# Patient Record
Sex: MALE | Race: BLACK OR AFRICAN AMERICAN | Employment: UNEMPLOYED | URBAN - METROPOLITAN AREA
[De-identification: names, ages, dates, MRNs, and addresses within clinical notes are randomized per-mention and may not be internally consistent; named-entity substitution may affect disease eponyms.]

---

## 2021-08-03 ENCOUNTER — APPOINTMENT (OUTPATIENT)
Dept: GENERAL RADIOLOGY | Age: 31
DRG: 871 | End: 2021-08-03
Attending: EMERGENCY MEDICINE

## 2021-08-03 ENCOUNTER — HOSPITAL ENCOUNTER (INPATIENT)
Age: 31
LOS: 8 days | Discharge: HOME | DRG: 871 | End: 2021-08-11
Attending: EMERGENCY MEDICINE | Admitting: HOSPITALIST

## 2021-08-03 DIAGNOSIS — J12.82 PNEUMONIA DUE TO COVID-19 VIRUS: Primary | ICD-10-CM

## 2021-08-03 DIAGNOSIS — U07.1 PNEUMONIA DUE TO COVID-19 VIRUS: Primary | ICD-10-CM

## 2021-08-03 DIAGNOSIS — J96.01 ACUTE RESPIRATORY FAILURE WITH HYPOXEMIA (HCC): ICD-10-CM

## 2021-08-03 LAB
25(OH)D3 SERPL-MCNC: 10.3 NG/ML (ref 30–100)
ALBUMIN SERPL-MCNC: 3.4 G/DL (ref 3.5–5)
ALBUMIN/GLOB SERPL: 0.8 {RATIO} (ref 1.1–2.2)
ALP SERPL-CCNC: 84 U/L (ref 45–117)
ALT SERPL-CCNC: 47 U/L (ref 12–78)
ANION GAP SERPL CALC-SCNC: 7 MMOL/L (ref 5–15)
APTT PPP: 25.9 SEC (ref 22.1–31)
AST SERPL-CCNC: 51 U/L (ref 15–37)
BASOPHILS # BLD: 0 K/UL (ref 0–0.1)
BASOPHILS NFR BLD: 0 % (ref 0–1)
BILIRUB SERPL-MCNC: 0.5 MG/DL (ref 0.2–1)
BUN SERPL-MCNC: 8 MG/DL (ref 6–20)
BUN/CREAT SERPL: 7 (ref 12–20)
CALCIUM SERPL-MCNC: 8.6 MG/DL (ref 8.5–10.1)
CHLORIDE SERPL-SCNC: 102 MMOL/L (ref 97–108)
CO2 SERPL-SCNC: 28 MMOL/L (ref 21–32)
COMMENT, HOLDF: NORMAL
COVID-19 RAPID TEST, COVR: NOT DETECTED
CREAT SERPL-MCNC: 1.08 MG/DL (ref 0.7–1.3)
CRP SERPL-MCNC: 5.06 MG/DL (ref 0–0.6)
D DIMER PPP FEU-MCNC: 0.44 MG/L FEU (ref 0–0.65)
DIFFERENTIAL METHOD BLD: ABNORMAL
EOSINOPHIL # BLD: 0 K/UL (ref 0–0.4)
EOSINOPHIL NFR BLD: 0 % (ref 0–7)
ERYTHROCYTE [DISTWIDTH] IN BLOOD BY AUTOMATED COUNT: 12.5 % (ref 11.5–14.5)
FERRITIN SERPL-MCNC: 311 NG/ML (ref 26–388)
GLOBULIN SER CALC-MCNC: 4.5 G/DL (ref 2–4)
GLUCOSE BLD STRIP.AUTO-MCNC: 119 MG/DL (ref 65–117)
GLUCOSE BLD STRIP.AUTO-MCNC: 120 MG/DL (ref 65–117)
GLUCOSE BLD STRIP.AUTO-MCNC: 122 MG/DL (ref 65–117)
GLUCOSE SERPL-MCNC: 108 MG/DL (ref 65–100)
HCT VFR BLD AUTO: 43.4 % (ref 36.6–50.3)
HGB BLD-MCNC: 14.8 G/DL (ref 12.1–17)
IMM GRANULOCYTES # BLD AUTO: 0 K/UL
IMM GRANULOCYTES NFR BLD AUTO: 0 %
INR PPP: 1 (ref 0.9–1.1)
LACTATE SERPL-SCNC: 1.3 MMOL/L (ref 0.4–2)
LDH SERPL L TO P-CCNC: 449 U/L (ref 85–241)
LYMPHOCYTES # BLD: 0.4 K/UL (ref 0.8–3.5)
LYMPHOCYTES NFR BLD: 10 % (ref 12–49)
MAGNESIUM SERPL-MCNC: 2.3 MG/DL (ref 1.6–2.4)
MCH RBC QN AUTO: 29.4 PG (ref 26–34)
MCHC RBC AUTO-ENTMCNC: 34.1 G/DL (ref 30–36.5)
MCV RBC AUTO: 86.1 FL (ref 80–99)
MONOCYTES # BLD: 0.1 K/UL (ref 0–1)
MONOCYTES NFR BLD: 2 % (ref 5–13)
NEUTS BAND NFR BLD MANUAL: 5 % (ref 0–6)
NEUTS SEG # BLD: 3.2 K/UL (ref 1.8–8)
NEUTS SEG NFR BLD: 83 % (ref 32–75)
NRBC # BLD: 0 K/UL (ref 0–0.01)
NRBC BLD-RTO: 0 PER 100 WBC
PLATELET # BLD AUTO: 140 K/UL (ref 150–400)
POTASSIUM SERPL-SCNC: 3.8 MMOL/L (ref 3.5–5.1)
PROCALCITONIN SERPL-MCNC: <0.05 NG/ML
PROT SERPL-MCNC: 7.9 G/DL (ref 6.4–8.2)
PROTHROMBIN TIME: 10.4 SEC (ref 9–11.1)
RBC # BLD AUTO: 5.04 M/UL (ref 4.1–5.7)
RBC MORPH BLD: ABNORMAL
SAMPLES BEING HELD,HOLD: NORMAL
SARS-COV-2, COV2: NORMAL
SERVICE CMNT-IMP: ABNORMAL
SODIUM SERPL-SCNC: 137 MMOL/L (ref 136–145)
SOURCE, COVRS: NORMAL
THERAPEUTIC RANGE,PTTT: NORMAL SECS (ref 58–77)
TROPONIN I SERPL-MCNC: <0.05 NG/ML
WBC # BLD AUTO: 3.7 K/UL (ref 4.1–11.1)

## 2021-08-03 PROCEDURE — 82962 GLUCOSE BLOOD TEST: CPT

## 2021-08-03 PROCEDURE — 65660000001 HC RM ICU INTERMED STEPDOWN

## 2021-08-03 PROCEDURE — 83735 ASSAY OF MAGNESIUM: CPT

## 2021-08-03 PROCEDURE — 96374 THER/PROPH/DIAG INJ IV PUSH: CPT

## 2021-08-03 PROCEDURE — 85730 THROMBOPLASTIN TIME PARTIAL: CPT

## 2021-08-03 PROCEDURE — 83615 LACTATE (LD) (LDH) ENZYME: CPT

## 2021-08-03 PROCEDURE — 74011250636 HC RX REV CODE- 250/636: Performed by: EMERGENCY MEDICINE

## 2021-08-03 PROCEDURE — 85610 PROTHROMBIN TIME: CPT

## 2021-08-03 PROCEDURE — 80053 COMPREHEN METABOLIC PANEL: CPT

## 2021-08-03 PROCEDURE — 93005 ELECTROCARDIOGRAM TRACING: CPT

## 2021-08-03 PROCEDURE — 74011000250 HC RX REV CODE- 250: Performed by: HOSPITALIST

## 2021-08-03 PROCEDURE — 74011250637 HC RX REV CODE- 250/637: Performed by: EMERGENCY MEDICINE

## 2021-08-03 PROCEDURE — 71045 X-RAY EXAM CHEST 1 VIEW: CPT

## 2021-08-03 PROCEDURE — XW033E5 INTRODUCTION OF REMDESIVIR ANTI-INFECTIVE INTO PERIPHERAL VEIN, PERCUTANEOUS APPROACH, NEW TECHNOLOGY GROUP 5: ICD-10-PCS | Performed by: HOSPITALIST

## 2021-08-03 PROCEDURE — 87635 SARS-COV-2 COVID-19 AMP PRB: CPT

## 2021-08-03 PROCEDURE — 74011000258 HC RX REV CODE- 258: Performed by: HOSPITALIST

## 2021-08-03 PROCEDURE — 74011250637 HC RX REV CODE- 250/637: Performed by: HOSPITALIST

## 2021-08-03 PROCEDURE — 82306 VITAMIN D 25 HYDROXY: CPT

## 2021-08-03 PROCEDURE — 84145 PROCALCITONIN (PCT): CPT

## 2021-08-03 PROCEDURE — 82728 ASSAY OF FERRITIN: CPT

## 2021-08-03 PROCEDURE — 85379 FIBRIN DEGRADATION QUANT: CPT

## 2021-08-03 PROCEDURE — 74011250636 HC RX REV CODE- 250/636: Performed by: HOSPITALIST

## 2021-08-03 PROCEDURE — 85025 COMPLETE CBC W/AUTO DIFF WBC: CPT

## 2021-08-03 PROCEDURE — 84484 ASSAY OF TROPONIN QUANT: CPT

## 2021-08-03 PROCEDURE — 99285 EMERGENCY DEPT VISIT HI MDM: CPT

## 2021-08-03 PROCEDURE — 36415 COLL VENOUS BLD VENIPUNCTURE: CPT

## 2021-08-03 PROCEDURE — U0003 INFECTIOUS AGENT DETECTION BY NUCLEIC ACID (DNA OR RNA); SEVERE ACUTE RESPIRATORY SYNDROME CORONAVIRUS 2 (SARS-COV-2) (CORONAVIRUS DISEASE [COVID-19]), AMPLIFIED PROBE TECHNIQUE, MAKING USE OF HIGH THROUGHPUT TECHNOLOGIES AS DESCRIBED BY CMS-2020-01-R: HCPCS

## 2021-08-03 PROCEDURE — 83605 ASSAY OF LACTIC ACID: CPT

## 2021-08-03 PROCEDURE — 86140 C-REACTIVE PROTEIN: CPT

## 2021-08-03 RX ORDER — MAGNESIUM SULFATE 100 %
4 CRYSTALS MISCELLANEOUS AS NEEDED
Status: DISCONTINUED | OUTPATIENT
Start: 2021-08-03 | End: 2021-08-11 | Stop reason: HOSPADM

## 2021-08-03 RX ORDER — DEXAMETHASONE SODIUM PHOSPHATE 10 MG/ML
6 INJECTION INTRAMUSCULAR; INTRAVENOUS EVERY 24 HOURS
Status: DISCONTINUED | OUTPATIENT
Start: 2021-08-03 | End: 2021-08-09 | Stop reason: ALTCHOICE

## 2021-08-03 RX ORDER — ZINC SULFATE 50(220)MG
1 CAPSULE ORAL DAILY
Status: DISCONTINUED | OUTPATIENT
Start: 2021-08-03 | End: 2021-08-11 | Stop reason: HOSPADM

## 2021-08-03 RX ORDER — INSULIN LISPRO 100 [IU]/ML
INJECTION, SOLUTION INTRAVENOUS; SUBCUTANEOUS
Status: DISCONTINUED | OUTPATIENT
Start: 2021-08-03 | End: 2021-08-11 | Stop reason: HOSPADM

## 2021-08-03 RX ORDER — DEXTROSE 50 % IN WATER (D50W) INTRAVENOUS SYRINGE
12.5-25 AS NEEDED
Status: DISCONTINUED | OUTPATIENT
Start: 2021-08-03 | End: 2021-08-11 | Stop reason: HOSPADM

## 2021-08-03 RX ORDER — DEXAMETHASONE SODIUM PHOSPHATE 10 MG/ML
10 INJECTION INTRAMUSCULAR; INTRAVENOUS
Status: COMPLETED | OUTPATIENT
Start: 2021-08-03 | End: 2021-08-03

## 2021-08-03 RX ORDER — ACETAMINOPHEN 325 MG/1
650 TABLET ORAL
Status: DISCONTINUED | OUTPATIENT
Start: 2021-08-03 | End: 2021-08-11 | Stop reason: HOSPADM

## 2021-08-03 RX ORDER — ACETAMINOPHEN 500 MG
1000 TABLET ORAL
Status: COMPLETED | OUTPATIENT
Start: 2021-08-03 | End: 2021-08-03

## 2021-08-03 RX ORDER — ENOXAPARIN SODIUM 100 MG/ML
30 INJECTION SUBCUTANEOUS EVERY 12 HOURS
Status: DISCONTINUED | OUTPATIENT
Start: 2021-08-03 | End: 2021-08-11 | Stop reason: HOSPADM

## 2021-08-03 RX ORDER — ASCORBIC ACID 500 MG
500 TABLET ORAL 2 TIMES DAILY
Status: DISCONTINUED | OUTPATIENT
Start: 2021-08-03 | End: 2021-08-11 | Stop reason: HOSPADM

## 2021-08-03 RX ADMIN — ENOXAPARIN SODIUM 30 MG: 40 INJECTION SUBCUTANEOUS at 21:32

## 2021-08-03 RX ADMIN — ENOXAPARIN SODIUM 30 MG: 40 INJECTION SUBCUTANEOUS at 09:37

## 2021-08-03 RX ADMIN — REMDESIVIR 200 MG: 100 INJECTION, POWDER, LYOPHILIZED, FOR SOLUTION INTRAVENOUS at 18:53

## 2021-08-03 RX ADMIN — DEXAMETHASONE SODIUM PHOSPHATE 6 MG: 10 INJECTION, SOLUTION INTRAMUSCULAR; INTRAVENOUS at 09:37

## 2021-08-03 RX ADMIN — ACETAMINOPHEN 650 MG: 325 TABLET ORAL at 23:02

## 2021-08-03 RX ADMIN — ACETAMINOPHEN 1000 MG: 500 TABLET ORAL at 06:24

## 2021-08-03 RX ADMIN — DEXAMETHASONE SODIUM PHOSPHATE 10 MG: 10 INJECTION, SOLUTION INTRAMUSCULAR; INTRAVENOUS at 06:25

## 2021-08-03 RX ADMIN — ZINC SULFATE 220 MG (50 MG) CAPSULE 1 CAPSULE: CAPSULE at 13:38

## 2021-08-03 RX ADMIN — OXYCODONE HYDROCHLORIDE AND ACETAMINOPHEN 500 MG: 500 TABLET ORAL at 18:58
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
## 2021-08-03 NOTE — ED TRIAGE NOTES
Pt arrives via EMS from home w/ CC COVID-19 infx. Reports he was dx w/ COVID-19 3 days ago and. SX includes HA, cough and back pain. EMS reports VSS.

## 2021-08-03 NOTE — PROGRESS NOTES
8/3/2021 -   TRANSITIONS OF CARE PLAN:   1. RUR: 7%  DESTINATION: TBD - likely own home  2. TRANSPORT: Transport Assistance  3. NEEDS FOR DISCHARGE:  4. ANTICIPATED FOLLOW UPS:  5. ONGOING INPATIENT NEEDS: O2 Supplementation with weaning as needed and possible, COVID PUI, Decadron, Possibly Remdesivir, monitor labs,     Anticipated Discharge is: Greater than 48 Hours    Reason for Admission:  Acute Hypoxemic Respiratory Failure, COVID 19                   RUR Score:       7%              Plan for utilizing home health:      TBD    PCP: First and Last name:  \"Светлана\"      [facility]   Are you a current patient: Yes/No: yes    Approximate date of last visit: 1 year; next on 8/9   Can you participate in a virtual visit with your PCP:  Yes                    Current Advanced Directive/Advance Care Plan: Full Code    Healthcare Decision Maker:   Click here to complete [id number] including selection of the Healthcare Decision Maker Relationship (ie \"Primary\")           Primary Decision Maker: Seth Kristalshaji - Sister: [phone]                  Transition of Care Plan:    CM notes that patient is currently on Droplet Plus Precautions. CM spoke with patient by room phone. Patient lives with mom in a 2 story home, 6 exterior steps, first floor living, 16 interior steps. Patient is independent in ADLs, to include driving. Patient is currently an  for [employer]. Patient has no hx of HH or Rehab. Pharmacy preference is unknown. Correct phone number is: [phone]             ACP completed, and patient agreed to AMD referral.  Referral submitted via CC. Per patient, he has a [facility] ins plan. CM notified UR of the above. Likely disposition is for discharge to own home with transport assistance. Care Management Interventions  PCP Verified by CM:  Yes ([facility])  Palliative Care Criteria Met (RRAT>21 & CHF Dx)?: No  Mode of Transport at Discharge:  Other (see comment) (will need transport assist)  Transition of Care Consult (CM Consult): Discharge Planning  MyChart Signup: No  Discharge Durable Medical Equipment: No (none)  Health Maintenance Reviewed: Yes (cm spoke with patient by phone with patient alert and oriented x4)  Physical Therapy Consult: No  Occupational Therapy Consult: No  Speech Therapy Consult: No  Current Support Network: Own Home, Family Lives Nearby  Confirm Follow Up Transport: Self (independent in adls, to include driving)  [employer] Provided?: No  Discharge Location  Discharge Placement: Home with family assistance (lives with mother in a 2 story home, first floor living, 6 exterior steps)  CRM: Agueda Lynch, MPH, [address] [place] [address]; Z: [phone]

## 2021-08-03 NOTE — PROGRESS NOTES
1249H  Patient's discharge paper work at bedside and shows patient is positive for Covid-19. Patient states it has been 4 days since he was diagnosed. Dr. Benito Sim paged to notify, unable to perfect serve.
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
## 2021-08-03 NOTE — PROGRESS NOTES
1906H   Attempted to call report, admitting RN currently in patient's room, will call back. 1915H    SBAR report given to LAURA Solitario of [facility].

## 2021-08-03 NOTE — PROGRESS NOTES
1900H   Staff reported that patient was banging on the door and was slowly lowering himself down. Staff assisted patient to be back on the stretcher. When this RN evaluated the patient, he reported that he was having a chest pain while coughing and pressed the call bell. When nobody attended to his call, he scooted to the end of the stretcher and reported that he he fell and hit his L fore head. Two side rails were up. While being assessed, he reported that his chest pain was going down about 6 out of 10. Attending MD to be notified. 1916H     The patient is resting quietly. Patient was advised to wait for assistance and do get out of bed. Patient acknowledged.

## 2021-08-04 LAB
ALBUMIN SERPL-MCNC: 2.9 G/DL (ref 3.5–5)
ALBUMIN/GLOB SERPL: 0.7 {RATIO} (ref 1.1–2.2)
ALP SERPL-CCNC: 75 U/L (ref 45–117)
ALT SERPL-CCNC: 53 U/L (ref 12–78)
ANION GAP SERPL CALC-SCNC: 6 MMOL/L (ref 5–15)
AST SERPL-CCNC: 49 U/L (ref 15–37)
ATRIAL RATE: 105 BPM
BASOPHILS # BLD: 0 K/UL (ref 0–0.1)
BASOPHILS NFR BLD: 0 % (ref 0–1)
BILIRUB SERPL-MCNC: 0.4 MG/DL (ref 0.2–1)
BUN SERPL-MCNC: 15 MG/DL (ref 6–20)
BUN/CREAT SERPL: 21 (ref 12–20)
CALCIUM SERPL-MCNC: 8.3 MG/DL (ref 8.5–10.1)
CALCULATED P AXIS, ECG09: 35 DEGREES
CALCULATED R AXIS, ECG10: 21 DEGREES
CALCULATED T AXIS, ECG11: 0 DEGREES
CHLORIDE SERPL-SCNC: 106 MMOL/L (ref 97–108)
CO2 SERPL-SCNC: 25 MMOL/L (ref 21–32)
CREAT SERPL-MCNC: 0.71 MG/DL (ref 0.7–1.3)
CRP SERPL-MCNC: 4.8 MG/DL (ref 0–0.6)
DIAGNOSIS, 93000: NORMAL
DIFFERENTIAL METHOD BLD: ABNORMAL
EOSINOPHIL # BLD: 0 K/UL (ref 0–0.4)
EOSINOPHIL NFR BLD: 0 % (ref 0–7)
ERYTHROCYTE [DISTWIDTH] IN BLOOD BY AUTOMATED COUNT: 12.4 % (ref 11.5–14.5)
GLOBULIN SER CALC-MCNC: 4.4 G/DL (ref 2–4)
GLUCOSE BLD STRIP.AUTO-MCNC: 103 MG/DL (ref 65–117)
GLUCOSE BLD STRIP.AUTO-MCNC: 105 MG/DL (ref 65–117)
GLUCOSE BLD STRIP.AUTO-MCNC: 111 MG/DL (ref 65–117)
GLUCOSE BLD STRIP.AUTO-MCNC: 137 MG/DL (ref 65–117)
GLUCOSE SERPL-MCNC: 109 MG/DL (ref 65–100)
HCT VFR BLD AUTO: 40.8 % (ref 36.6–50.3)
HGB BLD-MCNC: 13.4 G/DL (ref 12.1–17)
IMM GRANULOCYTES # BLD AUTO: 0 K/UL (ref 0–0.04)
IMM GRANULOCYTES NFR BLD AUTO: 0 % (ref 0–0.5)
LYMPHOCYTES # BLD: 0.6 K/UL (ref 0.8–3.5)
LYMPHOCYTES NFR BLD: 11 % (ref 12–49)
MCH RBC QN AUTO: 28.5 PG (ref 26–34)
MCHC RBC AUTO-ENTMCNC: 32.8 G/DL (ref 30–36.5)
MCV RBC AUTO: 86.6 FL (ref 80–99)
MONOCYTES # BLD: 0.3 K/UL (ref 0–1)
MONOCYTES NFR BLD: 6 % (ref 5–13)
NEUTS SEG # BLD: 4.7 K/UL (ref 1.8–8)
NEUTS SEG NFR BLD: 83 % (ref 32–75)
NRBC # BLD: 0 K/UL (ref 0–0.01)
NRBC BLD-RTO: 0 PER 100 WBC
P-R INTERVAL, ECG05: 134 MS
PLATELET # BLD AUTO: 195 K/UL (ref 150–400)
PMV BLD AUTO: 10.7 FL (ref 8.9–12.9)
POTASSIUM SERPL-SCNC: 4 MMOL/L (ref 3.5–5.1)
PROCALCITONIN SERPL-MCNC: <0.05 NG/ML
PROT SERPL-MCNC: 7.3 G/DL (ref 6.4–8.2)
Q-T INTERVAL, ECG07: 306 MS
QRS DURATION, ECG06: 76 MS
QTC CALCULATION (BEZET), ECG08: 404 MS
RBC # BLD AUTO: 4.71 M/UL (ref 4.1–5.7)
RBC MORPH BLD: ABNORMAL
SARS-COV-2, XPLCVT: DETECTED
SERVICE CMNT-IMP: ABNORMAL
SERVICE CMNT-IMP: NORMAL
SODIUM SERPL-SCNC: 137 MMOL/L (ref 136–145)
SOURCE, COVRS: ABNORMAL
VENTRICULAR RATE, ECG03: 105 BPM
WBC # BLD AUTO: 5.6 K/UL (ref 4.1–11.1)

## 2021-08-04 PROCEDURE — 80053 COMPREHEN METABOLIC PANEL: CPT

## 2021-08-04 PROCEDURE — 36415 COLL VENOUS BLD VENIPUNCTURE: CPT

## 2021-08-04 PROCEDURE — 74011250637 HC RX REV CODE- 250/637: Performed by: HOSPITALIST

## 2021-08-04 PROCEDURE — 74011250636 HC RX REV CODE- 250/636: Performed by: HOSPITALIST

## 2021-08-04 PROCEDURE — 85025 COMPLETE CBC W/AUTO DIFF WBC: CPT

## 2021-08-04 PROCEDURE — 82962 GLUCOSE BLOOD TEST: CPT

## 2021-08-04 PROCEDURE — 65660000001 HC RM ICU INTERMED STEPDOWN

## 2021-08-04 PROCEDURE — 74011000250 HC RX REV CODE- 250: Performed by: HOSPITALIST

## 2021-08-04 PROCEDURE — 74011000258 HC RX REV CODE- 258: Performed by: HOSPITALIST

## 2021-08-04 PROCEDURE — 86140 C-REACTIVE PROTEIN: CPT

## 2021-08-04 PROCEDURE — 84145 PROCALCITONIN (PCT): CPT

## 2021-08-04 RX ORDER — MELATONIN
2000 DAILY
Status: DISCONTINUED | OUTPATIENT
Start: 2021-08-04 | End: 2021-08-11 | Stop reason: HOSPADM

## 2021-08-04 RX ADMIN — OXYCODONE HYDROCHLORIDE AND ACETAMINOPHEN 500 MG: 500 TABLET ORAL at 18:55

## 2021-08-04 RX ADMIN — ENOXAPARIN SODIUM 30 MG: 40 INJECTION SUBCUTANEOUS at 21:38

## 2021-08-04 RX ADMIN — OXYCODONE HYDROCHLORIDE AND ACETAMINOPHEN 500 MG: 500 TABLET ORAL at 10:21

## 2021-08-04 RX ADMIN — ENOXAPARIN SODIUM 30 MG: 40 INJECTION SUBCUTANEOUS at 10:21

## 2021-08-04 RX ADMIN — Medication 2000 UNITS: at 10:38

## 2021-08-04 RX ADMIN — ACETAMINOPHEN 650 MG: 325 TABLET ORAL at 23:18

## 2021-08-04 RX ADMIN — DEXAMETHASONE SODIUM PHOSPHATE 6 MG: 10 INJECTION, SOLUTION INTRAMUSCULAR; INTRAVENOUS at 10:21

## 2021-08-04 RX ADMIN — ACETAMINOPHEN 650 MG: 325 TABLET ORAL at 10:38

## 2021-08-04 RX ADMIN — REMDESIVIR 100 MG: 100 INJECTION, POWDER, LYOPHILIZED, FOR SOLUTION INTRAVENOUS at 18:55

## 2021-08-04 RX ADMIN — ZINC SULFATE 220 MG (50 MG) CAPSULE 1 CAPSULE: CAPSULE at 10:21

## 2021-08-04 NOTE — PROGRESS NOTES
Bedside and Verbal shift change report given to LAURA Park (oncoming nurse) by Raymundo Randhawa RN (offgoing nurse). Report included the following information SBAR, Kardex, Intake/Output, MAR and Recent Results.

## 2021-08-04 NOTE — PROGRESS NOTES
Hospitalist Progress Note                               Stephanie Nunn MD                                     Answering service: [phone] from in house phone                                         Date of Service:  2021  NAME:  Cole Wallace  :  1990  MRN:  [id number]      Admission Summary:   Cole Wallace is a 28 y.o. male  With no significant PMH came to the emergency room with cc of shortness of breath.       Patient states that he goes to school in [place], one of his co worker was sick and since Friday patient started having fever,bodyaches,headache. He went to ER [facility]- they told him he was positive for COVID 19,he was discharged with instructions to quarantine . he came to [facility] on . His symptoms progressively worsened associated  with cough and worsening shortness of breath in the last 2 days. He feels nauseous and some diarrhea as well. Reason for follow up:     COVID pneumonia     Assessment & Plan:     1. Acute hypoxic respiratory failure due to COVID Pneumonia: On 6 L of NC. Saturating well 94 percent. On Dexamethasone and Remdesivir. D Dimer 0.44. CRP 5 -> 4.8. Rapid COVID was negative in ER but was positive in [place] . PCR is pending. In no acute distress breathing comfortably. 2. History of NIDDM: On no meds at home. Monitor sugars as on Steroids now. 3. Vit D Defficency : Vit D added      Diet: Diabetic  Code status: Full  DVT prophylaxis: Lovenox  Care Plan discussed with: Patient and RN  Patient has given Verbal permission to discuss medical care with   persons present in the room and and also with contact as listed on face sheet.    Discharge planning/disposition: when stable    Hospital Problems  Date Reviewed: 2011        Codes Class Noted POA    Acute hypoxemic respiratory failure [facility]) ICD-10-CM: J96.01  ICD-9-CM: 518.81  8/3/2021 Unknown        COVID-19 ICD-10-CM: U07.1  ICD-9-CM: 079.89  8/3/2021 Unknown                Review of Systems:   A comprehensive review of systems was negative except for that written in the HPI. Physical Examination:      Last 24hrs VS reviewed since prior progress note. Most recent are:  Visit Vitals  /65 (BP 1 Location: Right upper arm, BP Patient Position: At rest)   Pulse 78   Temp 98.4 °F (36.9 °C)   Resp 16   Ht 5' 4\" (1.626 m)   Wt 71.8 kg (158 lb 6.4 oz)   SpO2 96%   BMI 27.19 kg/m²           Constitutional:  No acute distress, cooperative, pleasant    HEENT: Head is a traumatic,  Un icteric sclera. Pink conjunctiva,no erythema or discharge. Oral mucous moist, oropharynx benign. Neck supple,    Resp:  CTA bilaterally. No wheezing/rhonchi/rales. No accessory muscle use   CV:  Regular rhythm, normal rate, no murmurs, gallops, rubs    GI:  Soft, non distended, non tender. normoactive bowel sounds, no hepatosplenomegaly    :  No CVA or suprapubic tenderness   Skin  :  No erythema,rash,bullae,dipigmentation     Musculoskeletal:  No edema, warm, 2+ pulses throughout    Neurologic:  AAOx3, CN II-XII reviewed. Moves all extremities.      Skin:  Good turgor, no rashes or ulcers         Intake/Output Summary (Last 24 hours) at 8/4/2021 0853  Last data filed at 8/4/2021 0400  Gross per 24 hour   Intake 300 ml   Output 800 ml   Net -500 ml          Data Review:    Review and/or order of clinical lab test      Labs:     Recent Labs     08/04/21  0432 08/03/21  0543   WBC 5.6 3.7*   HGB 13.4 14.8   HCT 40.8 43.4    140*     Recent Labs     08/04/21  0432 08/03/21  0543    137   K 4.0 3.8    102   CO2 25 28   BUN 15 8   CREA 0.71 1.08   * 108*   CA 8.3* 8.6   MG  --  2.3     Recent Labs     08/04/21  0432 08/03/21  0543   ALT 53 47   AP 75 84   TBILI 0.4 0.5   TP 7.3 7.9   ALB 2.9* 3.4*   GLOB 4.4* 4.5*     Recent Labs     08/03/21  0654   INR 1.0   PTP 10.4   APTT 25.9      Recent Labs     08/03/21  0543   FERR 311      No results found for: FOL, RBCF   No results for input(s): PH, PCO2, PO2 in the last 72 hours.   Recent Labs     08/03/21  0543   TROIQ <0.05     No results found for: CHOL, CHOLX, CHLST, CHOLV, HDL, HDLP, LDL, LDLC, DLDLP, TGLX, Ronelle Albert, CHHD, CHHDX  Lab Results   Component Value Date/Time    Glucose (POC) 103 08/04/2021 08:14 AM    Glucose (POC) 122 (H) 08/03/2021 10:42 PM    Glucose (POC) 120 (H) 08/03/2021 05:40 PM    Glucose (POC) 119 (H) 08/03/2021 11:31 AM    Glucose (POC) 124 (H) 08/07/2011 02:02 PM     Lab Results   Component Value Date/Time    Color YELLOW 08/07/2011 01:30 PM    Appearance CLEAR 08/07/2011 01:30 PM    Specific gravity 1.024 08/07/2011 01:30 PM    pH (UA) 6.0 08/07/2011 01:30 PM    Protein 30 (A) 08/07/2011 01:30 PM    Glucose NEGATIVE  08/07/2011 01:30 PM    Ketone NEGATIVE  08/07/2011 01:30 PM    Bilirubin NEGATIVE  08/07/2011 01:30 PM    Urobilinogen 0.2 08/07/2011 01:30 PM    Nitrites NEGATIVE  08/07/2011 01:30 PM    Leukocyte Esterase NEGATIVE  08/07/2011 01:30 PM    Epithelial cells 0-5 08/07/2011 01:30 PM    Bacteria NEGATIVE  08/07/2011 01:30 PM    WBC 0-4 08/07/2011 01:30 PM    RBC 0-3 08/07/2011 01:30 PM         Medications Reviewed:     Current Facility-Administered Medications   Medication Dose Route Frequency    insulin lispro (HUMALOG) injection   SubCUTAneous AC&HS    glucose chewable tablet 16 g  4 Tablet Oral PRN    dextrose (D50W) injection syrg 12.5-25 g  12.5-25 g IntraVENous PRN    glucagon (GLUCAGEN) injection 1 mg  1 mg IntraMUSCular PRN    enoxaparin (LOVENOX) injection 30 mg  30 mg SubCUTAneous Q12H    dexamethasone (PF) (DECADRON) 10 mg/mL injection 6 mg  6 mg IntraVENous Q24H    acetaminophen (TYLENOL) tablet 650 mg  650 mg Oral Q4H PRN    ascorbic acid (vitamin C) (VITAMIN C) tablet 500 mg  500 mg Oral BID    zinc sulfate (ZINCATE) 50 mg zinc (220 mg) capsule 1 Capsule  1 Capsule Oral DAILY    remdesivir 100 mg in 0.9% sodium chloride 250 mL IVPB  100 mg IntraVENous Q24H     ______________________________________________________________________  EXPECTED LENGTH OF STAY: - - -  ACTUAL LENGTH OF STAY:          [address] [facility] MD

## 2021-08-04 NOTE — PROGRESS NOTES
Bedside shift change report given to Lukasz Garcia RN (oncoming nurse) by Denisse Alex RN (offgoing nurse). Report included the following information SBAR, Kardex and ED Summary. Bedside shift change report given to LAURA Solitario (oncoming nurse) by Lukasz Garcia RN (offgoing nurse). Report included the following information SBAR, Kardex, ED Summary, Intake/Output, MAR, Recent Results and Cardiac Rhythm NSR/ Sinus Tach.

## 2021-08-04 NOTE — PROGRESS NOTES
Bedside shift change report given to LAURA Bradley (oncoming nurse) by Milton Bryant (offgoing nurse). Report included the following information SBAR, Kardex, ED Summary, [facility] and Recent Results. TRANSFER - IN REPORT:    Verbal report received from LAURA Amato(name) on Carmen Stable  being received from ED(unit) for routine progression of care      Report consisted of patients Situation, Background, Assessment and   Recommendations(SBAR). Information from the following report(s) SBAR, Kardex, ED Summary, [facility] and Recent Results was reviewed with the receiving nurse. Opportunity for questions and clarification was provided. Assessment completed upon patients arrival to unit and care assumed.

## 2021-08-04 NOTE — ACP (ADVANCE CARE PLANNING)
Advance Care Planning   Advance Care Planning Inpatient Note  [address] Department    Today's Date: 8/4/2021  Unit: [facility] 4 IMCU    Received request from admission screening. Upon review of chart and communication with care team, patient's decision making abilities are not in question. Pt's RN gave paperwork to pt as he has covid precautions. was/were present in the room during visit. Goals of ACP Conversation:  ACP conversation has not occured at this time. Health Care Decision Makers:      Primary Decision Maker: Yamil Ayers - Other Relative - [phone]  Click here to complete [address] including selection of the Healthcare Decision Maker Relationship (ie \"Primary\")     Today we:    gave pt's RN Advance Medical Directive (AMD), \"Your Right to Decide\" pamphlet and personal note, letting pt know to have [phone] contacted if he has any questions or would like to complete AMD.     Advance Care Planning Documents (Patient Wishes) on file:  None     Assessment:     gave pt's RN Advance Medical Directive (AMD), \"Your Right to Decide\" pamphlet and personal note, letting pt know to have [phone] contacted if he has any questions or would like to complete AMD.      Interventions:   gave pt's RN Advance Medical Directive (AMD), \"Your Right to Decide\" pamphlet and personal note, letting pt know to have [phone] contacted if he has any questions or would like to complete AMD.      Outcomes/Plan:  ACP Discussion Postposed     Electronically signed by Margarito Zhou on 8/4/2021 at 10:36 AM

## 2021-08-04 NOTE — PROGRESS NOTES
Spiritual Care Assessment/Progress Note  [facility]      NAME: Kate Amaya      MRN: [id number]  AGE: 28 y.o.  SEX: male  Zoroastrian Affiliation: No Church   Language: English     8/4/2021     Total Time (in minutes): 40     Spiritual Assessment begun in [facility] 4 IMCU through conversation with:         [x]Patient        [] Family    [] Friend(s)        Reason for Consult: Advance medical directive consult     Spiritual beliefs: (Please include comment if needed)     [] Identifies with a richar tradition:         [] Supported by a richar community:            [] Claims no spiritual orientation:           [] Seeking spiritual identity:                [] Adheres to an individual form of spirituality:           [x] Not able to assess:                           Identified resources for coping:      [] Prayer                               [] Music                  [] Guided Imagery     [] Family/friends                 [] Pet visits     [] Devotional reading                         [x] Unknown     [] Other:                                               Interventions offered during this visit: (See comments for more details)                Plan of Care:     [] Support spiritual and/or cultural needs    [] Support AMD and/or advance care planning process      [] Support grieving process   [] Coordinate Rites and/or Rituals    [] Coordination with community clergy   [] No spiritual needs identified at this time   [] Detailed Plan of Care below (See Comments)  [] Make referral to Music Therapy  [] Make referral to Pet Therapy     [] Make referral to Addiction services  [] Make referral to [facility]  [] Make referral to Spiritual Care Partner  [] No future visits requested        [x] Follow up upon further referrals     Comments:  gave pt's RN Advance Medical Directive (AMD), \"Your Right to Decide\" pamphlet and personal note, letting pt know to have [phone] contacted if he has any questions or would like to complete AMD.     Chaplain Brigitte Esquivel M.Div, MACE   [phone])

## 2021-08-04 NOTE — ED NOTES
Verbal shift change report given to Adeola Park (oncoming nurse) by Ashok Billings RN (offgoing nurse). Report included the following information SBAR, Kardex, ED Summary, [facility] and Recent Results.

## 2021-08-04 NOTE — PROGRESS NOTES
Called NP regarding patient's reported fall, instructed to monitor patient for change of status. Admitting RN updated. Patient denies pain at this time and no redness noted on L forehead where he hit as reported by patient.

## 2021-08-05 LAB
ABO + RH BLD: NORMAL
ALBUMIN SERPL-MCNC: 2.9 G/DL (ref 3.5–5)
ALBUMIN/GLOB SERPL: 0.6 {RATIO} (ref 1.1–2.2)
ALP SERPL-CCNC: 70 U/L (ref 45–117)
ALT SERPL-CCNC: 84 U/L (ref 12–78)
ANION GAP SERPL CALC-SCNC: 5 MMOL/L (ref 5–15)
AST SERPL-CCNC: 65 U/L (ref 15–37)
BASOPHILS # BLD: 0 K/UL (ref 0–0.1)
BASOPHILS NFR BLD: 0 % (ref 0–1)
BILIRUB SERPL-MCNC: 0.5 MG/DL (ref 0.2–1)
BLOOD GROUP ANTIBODIES SERPL: NORMAL
BUN SERPL-MCNC: 18 MG/DL (ref 6–20)
BUN/CREAT SERPL: 23 (ref 12–20)
CALCIUM SERPL-MCNC: 8.3 MG/DL (ref 8.5–10.1)
CHLORIDE SERPL-SCNC: 104 MMOL/L (ref 97–108)
CO2 SERPL-SCNC: 28 MMOL/L (ref 21–32)
CREAT SERPL-MCNC: 0.79 MG/DL (ref 0.7–1.3)
D DIMER PPP FEU-MCNC: 0.34 MG/L FEU (ref 0–0.65)
DIFFERENTIAL METHOD BLD: ABNORMAL
EOSINOPHIL # BLD: 0 K/UL (ref 0–0.4)
EOSINOPHIL NFR BLD: 0 % (ref 0–7)
ERYTHROCYTE [DISTWIDTH] IN BLOOD BY AUTOMATED COUNT: 12.6 % (ref 11.5–14.5)
GLOBULIN SER CALC-MCNC: 4.5 G/DL (ref 2–4)
GLUCOSE BLD STRIP.AUTO-MCNC: 104 MG/DL (ref 65–117)
GLUCOSE BLD STRIP.AUTO-MCNC: 106 MG/DL (ref 65–117)
GLUCOSE BLD STRIP.AUTO-MCNC: 116 MG/DL (ref 65–117)
GLUCOSE BLD STRIP.AUTO-MCNC: 153 MG/DL (ref 65–117)
GLUCOSE SERPL-MCNC: 105 MG/DL (ref 65–100)
HCT VFR BLD AUTO: 40.6 % (ref 36.6–50.3)
HGB BLD-MCNC: 13.4 G/DL (ref 12.1–17)
IMM GRANULOCYTES # BLD AUTO: 0 K/UL (ref 0–0.04)
IMM GRANULOCYTES NFR BLD AUTO: 0 % (ref 0–0.5)
LYMPHOCYTES # BLD: 0.6 K/UL (ref 0.8–3.5)
LYMPHOCYTES NFR BLD: 12 % (ref 12–49)
MCH RBC QN AUTO: 28.6 PG (ref 26–34)
MCHC RBC AUTO-ENTMCNC: 33 G/DL (ref 30–36.5)
MCV RBC AUTO: 86.8 FL (ref 80–99)
MONOCYTES # BLD: 0.4 K/UL (ref 0–1)
MONOCYTES NFR BLD: 8 % (ref 5–13)
NEUTS SEG # BLD: 3.7 K/UL (ref 1.8–8)
NEUTS SEG NFR BLD: 80 % (ref 32–75)
NRBC # BLD: 0 K/UL (ref 0–0.01)
NRBC BLD-RTO: 0 PER 100 WBC
PLATELET # BLD AUTO: 235 K/UL (ref 150–400)
PMV BLD AUTO: 10.3 FL (ref 8.9–12.9)
POTASSIUM SERPL-SCNC: 4.2 MMOL/L (ref 3.5–5.1)
PROT SERPL-MCNC: 7.4 G/DL (ref 6.4–8.2)
RBC # BLD AUTO: 4.68 M/UL (ref 4.1–5.7)
RBC MORPH BLD: ABNORMAL
SERVICE CMNT-IMP: ABNORMAL
SERVICE CMNT-IMP: NORMAL
SODIUM SERPL-SCNC: 137 MMOL/L (ref 136–145)
SPECIMEN EXP DATE BLD: NORMAL
WBC # BLD AUTO: 4.7 K/UL (ref 4.1–11.1)

## 2021-08-05 PROCEDURE — 74011250637 HC RX REV CODE- 250/637: Performed by: HOSPITALIST

## 2021-08-05 PROCEDURE — 85025 COMPLETE CBC W/AUTO DIFF WBC: CPT

## 2021-08-05 PROCEDURE — 74011000258 HC RX REV CODE- 258: Performed by: HOSPITALIST

## 2021-08-05 PROCEDURE — 80053 COMPREHEN METABOLIC PANEL: CPT

## 2021-08-05 PROCEDURE — 74011000250 HC RX REV CODE- 250: Performed by: HOSPITALIST

## 2021-08-05 PROCEDURE — 74011250636 HC RX REV CODE- 250/636: Performed by: HOSPITALIST

## 2021-08-05 PROCEDURE — 86901 BLOOD TYPING SEROLOGIC RH(D): CPT

## 2021-08-05 PROCEDURE — 36415 COLL VENOUS BLD VENIPUNCTURE: CPT

## 2021-08-05 PROCEDURE — 65660000001 HC RM ICU INTERMED STEPDOWN

## 2021-08-05 PROCEDURE — 74011250637 HC RX REV CODE- 250/637: Performed by: NURSE PRACTITIONER

## 2021-08-05 PROCEDURE — 85379 FIBRIN DEGRADATION QUANT: CPT

## 2021-08-05 PROCEDURE — 74011250636 HC RX REV CODE- 250/636: Performed by: PHYSICIAN ASSISTANT

## 2021-08-05 PROCEDURE — 82787 IGG 1 2 3 OR 4 EACH: CPT

## 2021-08-05 PROCEDURE — 82962 GLUCOSE BLOOD TEST: CPT

## 2021-08-05 PROCEDURE — 77010033678 HC OXYGEN DAILY

## 2021-08-05 RX ORDER — GUAIFENESIN/DEXTROMETHORPHAN 100-10MG/5
10 SYRUP ORAL
Status: DISCONTINUED | OUTPATIENT
Start: 2021-08-05 | End: 2021-08-11 | Stop reason: HOSPADM

## 2021-08-05 RX ORDER — FUROSEMIDE 10 MG/ML
20 INJECTION INTRAMUSCULAR; INTRAVENOUS ONCE
Status: COMPLETED | OUTPATIENT
Start: 2021-08-05 | End: 2021-08-05

## 2021-08-05 RX ORDER — TRAMADOL HYDROCHLORIDE 50 MG/1
50 TABLET ORAL
Status: ACTIVE | OUTPATIENT
Start: 2021-08-05 | End: 2021-08-05

## 2021-08-05 RX ADMIN — DEXAMETHASONE SODIUM PHOSPHATE 6 MG: 10 INJECTION, SOLUTION INTRAMUSCULAR; INTRAVENOUS at 09:40

## 2021-08-05 RX ADMIN — FUROSEMIDE 20 MG: 10 INJECTION, SOLUTION INTRAMUSCULAR; INTRAVENOUS at 14:02

## 2021-08-05 RX ADMIN — OXYCODONE HYDROCHLORIDE AND ACETAMINOPHEN 500 MG: 500 TABLET ORAL at 09:40

## 2021-08-05 RX ADMIN — ENOXAPARIN SODIUM 30 MG: 40 INJECTION SUBCUTANEOUS at 09:40

## 2021-08-05 RX ADMIN — REMDESIVIR 100 MG: 100 INJECTION, POWDER, LYOPHILIZED, FOR SOLUTION INTRAVENOUS at 17:53

## 2021-08-05 RX ADMIN — ENOXAPARIN SODIUM 30 MG: 40 INJECTION SUBCUTANEOUS at 21:40

## 2021-08-05 RX ADMIN — OXYCODONE HYDROCHLORIDE AND ACETAMINOPHEN 500 MG: 500 TABLET ORAL at 17:53

## 2021-08-05 RX ADMIN — Medication 2000 UNITS: at 09:40

## 2021-08-05 RX ADMIN — ZINC SULFATE 220 MG (50 MG) CAPSULE 1 CAPSULE: CAPSULE at 09:40

## 2021-08-05 NOTE — PROGRESS NOTES
Bedside shift change report given to Tushar Watson RN (oncoming nurse) by Jessica Sigala RN (offgoing nurse). Report included the following information SBAR, Kardex, Intake/Output, MAR, Recent Results and Cardiac Rhythm NSR. Pt on 6L O2 at start of shift. Pt became tachypneic, hyperventilating with increased work of breathing. RR rate in the 40s. O2 sats barely at 90%. RT contacted. NP contacted. Orders for hiflow received. Pt put on 12L midflow. RR decreased to 20s and O2 increased to mid-upper 90s%. Pt c/o back pain. Given Tylenol. Pt stated pain unrelieved with Tylenol. NP notified. One time order of tramadol received. Pt fell asleep. Upon waking had no complaints of pain. Tramadol returned. Bedside shift change report given to Eligio Robin RN (oncoming nurse) by Tushar Watson RN (offgoing nurse). Report included the following information SBAR, Kardex, ED Summary, Intake/Output, MAR, Recent Results and Cardiac Rhythm NSR/ Sinus Tach.

## 2021-08-05 NOTE — PROGRESS NOTES
1000: Pt slightly SOB sitting in bed. Per tech, attempted to ambulate to give bath but pt was unable to tolerate. Pulse ox reflecting 88%, changed pulse ox and obtained good pleth, elevated HOB to high thornton's. S/w RT; increased midflow oxygen to 15 L. Sats currently at 95%. RT aware. MD informed.
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
## 2021-08-05 NOTE — PROGRESS NOTES
Hospitalist Progress Note                               Karina Vicente MD                                     Answering service: [phone] from in house phone                                         Date of Service:  2021  NAME:  Amanda Lazaro  :  1990  MRN:  [id number]      Admission Summary:   k\"Cesar Dee is a 28 y.o. male  With no significant PMH came to the emergency room with cc of shortness of breath.       Patient states that he goes to school in [place], one of his co worker was sick and since Friday patient started having fever,bodyaches,headache. He went to ER [facility]- they told him he was positive for COVID 19,he was discharged with instructions to quarantine . he came to [place] on . His symptoms progressively worsened associated  with cough and worsening shortness of breath in the last 2 days. He feels nauseous and some diarrhea as well. \"     Reason for follow up:   COVID pneumonia     Assessment & Plan:     Acute hypoxic respiratory failure due to COVID Pneumonia:   Still requires supplemental O2  Resume Dexamethasone and Remdesivir. D Dimer 0.44. CRP 5 -> 4.8. Rapid COVID was negative in ER but was positive in [place] . PCR is pending. History of NIDDM: On no meds at home. Monitor sugars as on Steroids now. Vit D Defficency  On vitamin D supplement    Mildly abnormal liver enzymes without symptoms  No fever  Trend for now and consider US pending clinical course and lab results.     Diet: Diabetic  Code status: Full  DVT prophylaxis: Lovenox  Care Plan discussed with:   Discharge planning/disposition: when stable    Hospital Problems  Date Reviewed: 2011        Codes Class Noted POA    [facility] ICD-10-CM: J96.01  ICD-9-CM: 518.81  8/3/2021 Unknown        COVID-19 ICD-10-CM: U07.1  ICD-9-CM: 079.89  8/3/2021 Unknown                Review of Systems:   A comprehensive review of systems was negative except for that written in the HPI. Physical Examination:      Last 24hrs VS reviewed since prior progress note. Most recent are:  Visit Vitals  /71   Pulse 80   Temp 98.8 °F (37.1 °C)   Resp (!) 31   Ht 5' 4\" (1.626 m)   Wt 71.8 kg (158 lb 6.4 oz)   SpO2 97%   BMI 27.19 kg/m²           Constitutional:  No acute distress   HEENT: Head is a traumatic, anicteric, neck supple    Resp:  adventitous sounds, faint crackles, no obvious wheezing, breath sounds heard bilaterally   CV:  Regular rhythm, normal rate, no rubs    GI:  Soft, non distended, non tender. normoactive bowel sounds, no guarding       Skin  :  No cyanosis    Musculoskeletal:  No edema, warm, 2+ pulses throughout, symmetrical muscle tone    Neurologic:  AAOx3, CN II-XII reviewed. Moves all extremities.            Data Review:    Review and/or order of clinical lab test      Labs:     Recent Labs     08/05/21 0207 08/04/21 0432   WBC 4.7 5.6   HGB 13.4 13.4   HCT 40.6 40.8    195     Recent Labs     08/05/21 0207 08/04/21 0432 08/03/21  0543    137 137   K 4.2 4.0 3.8    106 102   CO2 28 25 28   BUN 18 15 8   CREA 0.79 0.71 1.08   * 109* 108*   CA 8.3* 8.3* 8.6   MG  --   --  2.3     Recent Labs     08/05/21 0207 08/04/21 0432 08/03/21  0543   ALT 84* 53 47   AP 70 75 84   TBILI 0.5 0.4 0.5   TP 7.4 7.3 7.9   ALB 2.9* 2.9* 3.4*   GLOB 4.5* 4.4* 4.5*     Recent Labs     08/03/21  0654   INR 1.0   PTP 10.4   APTT 25.9      Recent Labs     08/03/21  0543   FERR 311        Recent Labs     08/03/21  0543   TROIQ <0.05       Lab Results   Component Value Date/Time    Glucose (POC) 106 08/05/2021 08:46 AM    Glucose (POC) 137 (H) 08/04/2021 09:16 PM    Glucose (POC) 111 08/04/2021 04:37 PM    Glucose (POC) 105 08/04/2021 01:10 PM    Glucose (POC) 103 08/04/2021 08:14 AM     Lab Results   Component Value Date/Time    Color YELLOW 08/07/2011 01:30 PM    Appearance CLEAR 08/07/2011 01:30 PM    Specific gravity 1.024 08/07/2011 01:30 PM    pH (UA) 6.0 08/07/2011 01:30 PM    Protein 30 (A) 08/07/2011 01:30 PM    Glucose NEGATIVE  08/07/2011 01:30 PM    Ketone NEGATIVE  08/07/2011 01:30 PM    Bilirubin NEGATIVE  08/07/2011 01:30 PM    Urobilinogen 0.2 08/07/2011 01:30 PM    Nitrites NEGATIVE  08/07/2011 01:30 PM    Leukocyte Esterase NEGATIVE  08/07/2011 01:30 PM    Epithelial cells 0-5 08/07/2011 01:30 PM    Bacteria NEGATIVE  08/07/2011 01:30 PM    WBC 0-4 08/07/2011 01:30 PM    RBC 0-3 08/07/2011 01:30 PM         Medications Reviewed:   ______________________________________________________________________  EXPECTED LENGTH OF STAY: 5d 9h  ACTUAL LENGTH OF STAY:          2                 Natalia Wright MD

## 2021-08-05 NOTE — PROGRESS NOTES
Bedside shift change report given to LAURA Bradley (oncoming nurse) by Xena London (offgoing nurse). Report included the following information SBAR, Kardex, ED Summary, [facility] and Recent Results.

## 2021-08-06 ENCOUNTER — APPOINTMENT (OUTPATIENT)
Dept: ULTRASOUND IMAGING | Age: 31
DRG: 871 | End: 2021-08-06
Attending: INTERNAL MEDICINE

## 2021-08-06 LAB
ALBUMIN SERPL-MCNC: 2.9 G/DL (ref 3.5–5)
ALBUMIN/GLOB SERPL: 0.6 {RATIO} (ref 1.1–2.2)
ALP SERPL-CCNC: 83 U/L (ref 45–117)
ALT SERPL-CCNC: 124 U/L (ref 12–78)
ANION GAP SERPL CALC-SCNC: 4 MMOL/L (ref 5–15)
AST SERPL-CCNC: 63 U/L (ref 15–37)
BASOPHILS # BLD: 0 K/UL (ref 0–0.1)
BASOPHILS NFR BLD: 0 % (ref 0–1)
BILIRUB SERPL-MCNC: 0.7 MG/DL (ref 0.2–1)
BNP SERPL-MCNC: 10 PG/ML
BUN SERPL-MCNC: 25 MG/DL (ref 6–20)
BUN/CREAT SERPL: 32 (ref 12–20)
CALCIUM SERPL-MCNC: 8.4 MG/DL (ref 8.5–10.1)
CHLORIDE SERPL-SCNC: 104 MMOL/L (ref 97–108)
CO2 SERPL-SCNC: 28 MMOL/L (ref 21–32)
CREAT SERPL-MCNC: 0.79 MG/DL (ref 0.7–1.3)
CRP SERPL-MCNC: 2.29 MG/DL (ref 0–0.6)
D DIMER PPP FEU-MCNC: 0.39 MG/L FEU (ref 0–0.65)
DIFFERENTIAL METHOD BLD: ABNORMAL
EOSINOPHIL # BLD: 0 K/UL (ref 0–0.4)
EOSINOPHIL NFR BLD: 0 % (ref 0–7)
ERYTHROCYTE [DISTWIDTH] IN BLOOD BY AUTOMATED COUNT: 12.1 % (ref 11.5–14.5)
GLOBULIN SER CALC-MCNC: 4.7 G/DL (ref 2–4)
GLUCOSE BLD STRIP.AUTO-MCNC: 110 MG/DL (ref 65–117)
GLUCOSE BLD STRIP.AUTO-MCNC: 130 MG/DL (ref 65–117)
GLUCOSE BLD STRIP.AUTO-MCNC: 139 MG/DL (ref 65–117)
GLUCOSE BLD STRIP.AUTO-MCNC: 91 MG/DL (ref 65–117)
GLUCOSE SERPL-MCNC: 101 MG/DL (ref 65–100)
HCT VFR BLD AUTO: 45.3 % (ref 36.6–50.3)
HGB BLD-MCNC: 14.7 G/DL (ref 12.1–17)
IMM GRANULOCYTES # BLD AUTO: 0 K/UL
IMM GRANULOCYTES NFR BLD AUTO: 0 %
LYMPHOCYTES # BLD: 0.6 K/UL (ref 0.8–3.5)
LYMPHOCYTES NFR BLD: 19 % (ref 12–49)
MCH RBC QN AUTO: 28.4 PG (ref 26–34)
MCHC RBC AUTO-ENTMCNC: 32.5 G/DL (ref 30–36.5)
MCV RBC AUTO: 87.5 FL (ref 80–99)
MONOCYTES # BLD: 0.4 K/UL (ref 0–1)
MONOCYTES NFR BLD: 12 % (ref 5–13)
NEUTS SEG # BLD: 2.2 K/UL (ref 1.8–8)
NEUTS SEG NFR BLD: 69 % (ref 32–75)
NRBC # BLD: 0 K/UL (ref 0–0.01)
NRBC BLD-RTO: 0 PER 100 WBC
PLATELET # BLD AUTO: 272 K/UL (ref 150–400)
PMV BLD AUTO: 10.5 FL (ref 8.9–12.9)
POTASSIUM SERPL-SCNC: 4.1 MMOL/L (ref 3.5–5.1)
PROT SERPL-MCNC: 7.6 G/DL (ref 6.4–8.2)
RBC # BLD AUTO: 5.18 M/UL (ref 4.1–5.7)
RBC MORPH BLD: ABNORMAL
SERVICE CMNT-IMP: ABNORMAL
SERVICE CMNT-IMP: ABNORMAL
SERVICE CMNT-IMP: NORMAL
SERVICE CMNT-IMP: NORMAL
SODIUM SERPL-SCNC: 136 MMOL/L (ref 136–145)
WBC # BLD AUTO: 3.2 K/UL (ref 4.1–11.1)
WBC MORPH BLD: ABNORMAL

## 2021-08-06 PROCEDURE — 74011250637 HC RX REV CODE- 250/637: Performed by: HOSPITALIST

## 2021-08-06 PROCEDURE — 82962 GLUCOSE BLOOD TEST: CPT

## 2021-08-06 PROCEDURE — 36415 COLL VENOUS BLD VENIPUNCTURE: CPT

## 2021-08-06 PROCEDURE — 86140 C-REACTIVE PROTEIN: CPT

## 2021-08-06 PROCEDURE — 65660000001 HC RM ICU INTERMED STEPDOWN

## 2021-08-06 PROCEDURE — 85025 COMPLETE CBC W/AUTO DIFF WBC: CPT

## 2021-08-06 PROCEDURE — 74011250636 HC RX REV CODE- 250/636: Performed by: HOSPITALIST

## 2021-08-06 PROCEDURE — 77010033711 HC HIGH FLOW OXYGEN

## 2021-08-06 PROCEDURE — 74011000258 HC RX REV CODE- 258: Performed by: HOSPITALIST

## 2021-08-06 PROCEDURE — 74011250637 HC RX REV CODE- 250/637: Performed by: NURSE PRACTITIONER

## 2021-08-06 PROCEDURE — 80053 COMPREHEN METABOLIC PANEL: CPT

## 2021-08-06 PROCEDURE — 83880 ASSAY OF NATRIURETIC PEPTIDE: CPT

## 2021-08-06 PROCEDURE — 76705 ECHO EXAM OF ABDOMEN: CPT

## 2021-08-06 PROCEDURE — 85379 FIBRIN DEGRADATION QUANT: CPT

## 2021-08-06 PROCEDURE — 74011000250 HC RX REV CODE- 250: Performed by: HOSPITALIST

## 2021-08-06 RX ADMIN — REMDESIVIR 100 MG: 100 INJECTION, POWDER, LYOPHILIZED, FOR SOLUTION INTRAVENOUS at 17:27

## 2021-08-06 RX ADMIN — OXYCODONE HYDROCHLORIDE AND ACETAMINOPHEN 500 MG: 500 TABLET ORAL at 10:22

## 2021-08-06 RX ADMIN — ZINC SULFATE 220 MG (50 MG) CAPSULE 1 CAPSULE: CAPSULE at 10:22

## 2021-08-06 RX ADMIN — GUAIFENESIN AND DEXTROMETHORPHAN 10 ML: 100; 10 SYRUP ORAL at 22:36

## 2021-08-06 RX ADMIN — DEXAMETHASONE SODIUM PHOSPHATE 6 MG: 10 INJECTION, SOLUTION INTRAMUSCULAR; INTRAVENOUS at 10:22

## 2021-08-06 RX ADMIN — ENOXAPARIN SODIUM 30 MG: 40 INJECTION SUBCUTANEOUS at 21:12

## 2021-08-06 RX ADMIN — ENOXAPARIN SODIUM 30 MG: 40 INJECTION SUBCUTANEOUS at 10:22

## 2021-08-06 RX ADMIN — ACETAMINOPHEN 650 MG: 325 TABLET ORAL at 22:35

## 2021-08-06 RX ADMIN — OXYCODONE HYDROCHLORIDE AND ACETAMINOPHEN 500 MG: 500 TABLET ORAL at 17:27

## 2021-08-06 RX ADMIN — Medication 2000 UNITS: at 10:22

## 2021-08-06 NOTE — PROGRESS NOTES
Bedside and Verbal shift change report given to Nahum Flores (oncoming nurse) by Med Ellis RN (offgoing nurse).  Report included the following information SBAR, ED Summary, Procedure Summary, Intake/Output, MAR, Med Rec Status and Cardiac Rhythm SR-ST.

## 2021-08-06 NOTE — PROGRESS NOTES
Hospitalist Progress Note                               Joel Dunn MD                                     Answering service: [phone] from in house phone                                         Date of Service:  2021  NAME:  Goldy Scales  :  1990  MRN:  [id number]      Admission Summary:   Daniel Christine is a [address] [place] y.o. male  With no significant PMH came to the emergency room with cc of shortness of breath. Patient states that he goes to school in [facility], one of his co worker was sick and since Friday patient started having fever,bodyaches,headache. He went to ER [facility]- they told him he was positive for COVID 19,he was discharged with instructions to quarantine . he came to [place] on . His symptoms progressively worsened associated  with cough and worsening shortness of breath in the last 2 days. He feels nauseous and some diarrhea as well. \"     Reason for follow up:   COVID pneumonia     Assessment & Plan:     Acute hypoxic respiratory failure due to COVID Pneumonia:   Still requires supplemental O2  Resume Dexamethasone and Remdesivir. D Dimer 0.44. CRP 5 -> 4.8. Rapid COVID was negative in ER but was positive in [facility] . History of NIDDM: On no meds at home. Monitor sugars as on Steroids now. Vit D Defficency  On vitamin D supplement    Mildly abnormal liver enzymes without symptoms  No fever  Trend for now and consider US pending clinical course and lab results.     Diet: Diabetic  Code status: Full  DVT prophylaxis: Lovenox  Care Plan discussed with:   Discharge planning/disposition: when stable    Hospital Problems  Date Reviewed: 2011        Codes Class Noted POA    Acute hypoxemic respiratory failure [facility]) ICD-10-CM: J96.01  ICD-9-CM: 518.81  8/3/2021 Unknown        COVID-19 ICD-10-CM: U07.1  ICD-9-CM: 079.89  8/3/2021 Unknown                Review of Systems:   A comprehensive review of systems was negative except for that written in the HPI. Physical Examination:      Last 24hrs VS reviewed since prior progress note. Most recent are:  Visit Vitals  /71 (BP 1 Location: Left upper arm, BP Patient Position: At rest)   Pulse 85   Temp 98.1 °F (36.7 °C)   Resp 18   Ht 5' 4\" (1.626 m)   Wt 69.8 kg (153 lb 14.4 oz)   SpO2 93%   BMI 26.42 kg/m²           Constitutional:  No acute distress   HEENT: Head is a traumatic, anicteric, neck supple    Resp:  adventitous sounds, faint crackles, no obvious wheezing, breath sounds heard bilaterally   CV:  Regular rhythm, normal rate, no rubs    GI:  Soft, non distended, non tender. normoactive bowel sounds, no guarding       Skin  :  No cyanosis    Musculoskeletal:  No edema, warm, 2+ pulses throughout, symmetrical muscle tone    Neurologic:  AAOx3, CN II-XII reviewed. Moves all extremities.            Data Review:    Review and/or order of clinical lab test      Labs:     Recent Labs     08/06/21 0450 08/05/21 0207   WBC 3.2* 4.7   HGB 14.7 13.4   HCT 45.3 40.6    235     Recent Labs     08/06/21 0450 08/05/21 0207 08/04/21  0432    137 137   K 4.1 4.2 4.0    104 106   CO2 28 28 25   BUN 25* 18 15   CREA 0.79 0.79 0.71   * 105* 109*   CA 8.4* 8.3* 8.3*     Recent Labs     08/06/21 0450 08/05/21 0207 08/04/21  0432   * 84* 53   AP 83 70 75   TBILI 0.7 0.5 0.4   TP 7.6 7.4 7.3   ALB 2.9* 2.9* 2.9*   GLOB 4.7* 4.5* 4.4*       Lab Results   Component Value Date/Time    Glucose (POC) 91 08/06/2021 08:46 AM    Glucose (POC) 153 (H) 08/05/2021 09:38 PM    Glucose (POC) 116 08/05/2021 05:52 PM    Glucose (POC) 104 08/05/2021 12:06 PM    Glucose (POC) 106 08/05/2021 08:46 AM     Lab Results   Component Value Date/Time    Color YELLOW 08/07/2011 01:30 PM    Appearance CLEAR 08/07/2011 01:30 PM    Specific gravity 1.024 08/07/2011 01:30 PM    pH (UA) 6.0 08/07/2011 01:30 PM    Protein 30 (A) 08/07/2011 01:30 PM    Glucose NEGATIVE  08/07/2011 01:30 PM    Ketone NEGATIVE  08/07/2011 01:30 PM    Bilirubin NEGATIVE  08/07/2011 01:30 PM    Urobilinogen 0.2 08/07/2011 01:30 PM    Nitrites NEGATIVE  08/07/2011 01:30 PM    Leukocyte Esterase NEGATIVE  08/07/2011 01:30 PM    Epithelial cells 0-5 08/07/2011 01:30 PM    Bacteria NEGATIVE  08/07/2011 01:30 PM    WBC 0-4 08/07/2011 01:30 PM    RBC 0-3 08/07/2011 01:30 PM         Medications Reviewed:   ______________________________________________________________________  EXPECTED LENGTH OF STAY: 5d 9h  ACTUAL LENGTH OF STAY:          3                 Tomasz Anaya MD

## 2021-08-06 NOTE — PROGRESS NOTES
PCCM:    On 11lpm, VSS    D dimer 0.39  CRP down to 2.29  probnp 10    Plan:  COVID19   Wean O2 with titration above 90%   Continue decadron and remdesivir   Follow up in 7 days as a telemedicine visit   We will be available again to see if needed    Layvonne Frankel, PA-C

## 2021-08-06 NOTE — PROGRESS NOTES
Problem: Falls - Risk of  Goal: *Absence of Falls  Description: Document Kim Grover Fall Risk and appropriate interventions in the flowsheet.   Outcome: Progressing Towards Goal  Note: Fall Risk Interventions:  Mobility Interventions: Communicate number of staff needed for ambulation/transfer, Bed/chair exit alarm, PT Consult for mobility concerns, PT Consult for assist device competence    Mentation Interventions: Door open when patient unattended, Increase mobility    Medication Interventions: Evaluate medications/consider consulting pharmacy, Patient to call before getting OOB, Teach patient to arise slowly    Elimination Interventions: Call light in reach, Patient to call for help with toileting needs, Stay With Me (per policy), Toilet paper/wipes in reach    History of Falls Interventions: Bed/chair exit alarm, Door open when patient unattended, Investigate reason for fall, Room close to nurse's station         Problem: Isolation Precautions - Risk of Spread of Infection  Goal: Prevent transmission of infectious organism to others  Outcome: Progressing Towards Goal

## 2021-08-07 LAB
ALBUMIN SERPL-MCNC: 2.9 G/DL (ref 3.5–5)
ALBUMIN/GLOB SERPL: 0.7 {RATIO} (ref 1.1–2.2)
ALP SERPL-CCNC: 81 U/L (ref 45–117)
ALT SERPL-CCNC: 127 U/L (ref 12–78)
ANION GAP SERPL CALC-SCNC: 4 MMOL/L (ref 5–15)
AST SERPL-CCNC: 51 U/L (ref 15–37)
BILIRUB SERPL-MCNC: 0.6 MG/DL (ref 0.2–1)
BNP SERPL-MCNC: <5 PG/ML
BUN SERPL-MCNC: 25 MG/DL (ref 6–20)
BUN/CREAT SERPL: 36 (ref 12–20)
CALCIUM SERPL-MCNC: 8.2 MG/DL (ref 8.5–10.1)
CHLORIDE SERPL-SCNC: 104 MMOL/L (ref 97–108)
CO2 SERPL-SCNC: 27 MMOL/L (ref 21–32)
CREAT SERPL-MCNC: 0.7 MG/DL (ref 0.7–1.3)
CRP SERPL-MCNC: 1.1 MG/DL (ref 0–0.6)
D DIMER PPP FEU-MCNC: 0.32 MG/L FEU (ref 0–0.65)
GLOBULIN SER CALC-MCNC: 4.4 G/DL (ref 2–4)
GLUCOSE BLD STRIP.AUTO-MCNC: 117 MG/DL (ref 65–117)
GLUCOSE BLD STRIP.AUTO-MCNC: 121 MG/DL (ref 65–117)
GLUCOSE BLD STRIP.AUTO-MCNC: 127 MG/DL (ref 65–117)
GLUCOSE BLD STRIP.AUTO-MCNC: 135 MG/DL (ref 65–117)
GLUCOSE SERPL-MCNC: 100 MG/DL (ref 65–100)
IGG SERPL-MCNC: 1238 MG/DL (ref 603–1613)
IGG1 SER-MCNC: 798 MG/DL (ref 248–810)
IGG2 SER-MCNC: 268 MG/DL (ref 130–555)
IGG3 SER-MCNC: 51 MG/DL (ref 15–102)
IGG4 SER-MCNC: 29 MG/DL (ref 2–96)
POTASSIUM SERPL-SCNC: 4.2 MMOL/L (ref 3.5–5.1)
PROT SERPL-MCNC: 7.3 G/DL (ref 6.4–8.2)
SERVICE CMNT-IMP: ABNORMAL
SERVICE CMNT-IMP: NORMAL
SODIUM SERPL-SCNC: 135 MMOL/L (ref 136–145)

## 2021-08-07 PROCEDURE — 74011250636 HC RX REV CODE- 250/636: Performed by: HOSPITALIST

## 2021-08-07 PROCEDURE — 82962 GLUCOSE BLOOD TEST: CPT

## 2021-08-07 PROCEDURE — 74011000250 HC RX REV CODE- 250: Performed by: HOSPITALIST

## 2021-08-07 PROCEDURE — 74011250637 HC RX REV CODE- 250/637: Performed by: HOSPITALIST

## 2021-08-07 PROCEDURE — 74011250637 HC RX REV CODE- 250/637: Performed by: NURSE PRACTITIONER

## 2021-08-07 PROCEDURE — 74011000258 HC RX REV CODE- 258: Performed by: HOSPITALIST

## 2021-08-07 PROCEDURE — 85379 FIBRIN DEGRADATION QUANT: CPT

## 2021-08-07 PROCEDURE — 80053 COMPREHEN METABOLIC PANEL: CPT

## 2021-08-07 PROCEDURE — 36415 COLL VENOUS BLD VENIPUNCTURE: CPT

## 2021-08-07 PROCEDURE — 65660000001 HC RM ICU INTERMED STEPDOWN

## 2021-08-07 PROCEDURE — 86140 C-REACTIVE PROTEIN: CPT

## 2021-08-07 PROCEDURE — 83880 ASSAY OF NATRIURETIC PEPTIDE: CPT

## 2021-08-07 RX ADMIN — ENOXAPARIN SODIUM 30 MG: 40 INJECTION SUBCUTANEOUS at 09:44

## 2021-08-07 RX ADMIN — OXYCODONE HYDROCHLORIDE AND ACETAMINOPHEN 500 MG: 500 TABLET ORAL at 18:11

## 2021-08-07 RX ADMIN — ENOXAPARIN SODIUM 30 MG: 40 INJECTION SUBCUTANEOUS at 21:43

## 2021-08-07 RX ADMIN — Medication 2000 UNITS: at 09:44

## 2021-08-07 RX ADMIN — REMDESIVIR 100 MG: 100 INJECTION, POWDER, LYOPHILIZED, FOR SOLUTION INTRAVENOUS at 18:11

## 2021-08-07 RX ADMIN — ZINC SULFATE 220 MG (50 MG) CAPSULE 1 CAPSULE: CAPSULE at 09:44

## 2021-08-07 RX ADMIN — OXYCODONE HYDROCHLORIDE AND ACETAMINOPHEN 500 MG: 500 TABLET ORAL at 09:44

## 2021-08-07 RX ADMIN — DEXAMETHASONE SODIUM PHOSPHATE 6 MG: 10 INJECTION, SOLUTION INTRAMUSCULAR; INTRAVENOUS at 09:44

## 2021-08-07 RX ADMIN — GUAIFENESIN AND DEXTROMETHORPHAN 10 ML: 100; 10 SYRUP ORAL at 03:29

## 2021-08-07 NOTE — PROGRESS NOTES
Hospitalist Progress Note                               Trell Germain MD                                     Answering service: [phone] from in house phone                                         Date of Service:  2021  NAME:  Barbara Walker  :  1990  MRN:  [id number]      Admission Summary:   Karey Guerrier is a 28 y.o. male  With no significant PMH came to the emergency room with cc of shortness of breath. Patient states that he goes to school in [place], one of his co worker was sick and since Friday patient started having fever,bodyaches,headache. He went to ER [facility]- they told him he was positive for COVID 19,he was discharged with instructions to quarantine . he came to [place] on . His symptoms progressively worsened associated  with cough and worsening shortness of breath in the last 2 days. He feels nauseous and some diarrhea as well. \"     Reason for follow up:   COVID pneumonia  He wants to go home. There is no new complaint     Assessment & Plan:     Acute hypoxic respiratory failure due to COVID Pneumonia:   Requiring high flow oxygen  Continue Dexamethasone and Remdesivir as per protocol  D Dimer 0.44. CRP 5 -> 4.8. Rapid COVID was negative in ER but was positive in [place] .      History of DM type 2:   Blood glucose is stable  On ISS as per protocol  Diabetic diet    Vit D Defficency  On vitamin D supplement    Transanminitis  Likely due to SARS COVID 2  Monitor    Diet: Diabetic  Code status: Full  DVT prophylaxis: Lovenox  Care Plan discussed with:   Discharge planning/disposition: Tbd    Hospital Problems  Date Reviewed: 2011        Codes Class Noted POA    [facility] ICD-10-CM: J96.01  ICD-9-CM: 518.81  8/3/2021 Unknown        COVID-19 ICD-10-CM: U07.1  ICD-9-CM: 079.89  8/3/2021 Unknown                Review of Systems:   A comprehensive review of systems was negative except for that written in the HPI. Physical Examination:      Last 24hrs VS reviewed since prior progress note. Most recent are:  Visit Vitals  /66   Pulse 74   Temp 98.4 °F (36.9 °C)   Resp 21   Ht 5' 4\" (1.626 m)   Wt 69.8 kg (153 lb 14.4 oz)   SpO2 95%   BMI 26.42 kg/m²           Constitutional:  No acute distress   HEENT: Head is a traumatic, anicteric, neck supple    Resp:  adventitous sounds, faint crackles, no obvious wheezing, breath sounds heard bilaterally   CV:  Regular rhythm, normal rate, no rubs    GI:  Soft, non distended, non tender. normoactive bowel sounds, no guarding       Skin  :  No cyanosis    Musculoskeletal:  No edema, warm, 2+ pulses throughout, symmetrical muscle tone    Neurologic:  AAOx3, CN II-XII reviewed. Moves all extremities.            Data Review:    Review and/or order of clinical lab test      Labs:     Recent Labs     08/06/21  0450 08/05/21  0207   WBC 3.2* 4.7   HGB 14.7 13.4   HCT 45.3 40.6    235     Recent Labs     08/07/21  0320 08/06/21  0450 08/05/21  0207   * 136 137   K 4.2 4.1 4.2    104 104   CO2 27 28 28   BUN 25* 25* 18   CREA 0.70 0.79 0.79    101* 105*   CA 8.2* 8.4* 8.3*     Recent Labs     08/07/21  0320 08/06/21  0450 08/05/21  0207   * 124* 84*   AP 81 83 70   TBILI 0.6 0.7 0.5   TP 7.3 7.6 7.4   ALB 2.9* 2.9* 2.9*   GLOB 4.4* 4.7* 4.5*       Lab Results   Component Value Date/Time    Glucose (POC) 117 08/07/2021 08:36 AM    Glucose (POC) 130 (H) 08/06/2021 08:55 PM    Glucose (POC) 110 08/06/2021 05:07 PM    Glucose (POC) 139 (H) 08/06/2021 11:33 AM    Glucose (POC) 91 08/06/2021 08:46 AM     Lab Results   Component Value Date/Time    Color YELLOW 08/07/2011 01:30 PM    Appearance CLEAR 08/07/2011 01:30 PM    Specific gravity 1.024 08/07/2011 01:30 PM    pH (UA) 6.0 08/07/2011 01:30 PM    Protein 30 (A) 08/07/2011 01:30 PM    Glucose NEGATIVE  08/07/2011 01:30 PM Ketone NEGATIVE  08/07/2011 01:30 PM    Bilirubin NEGATIVE  08/07/2011 01:30 PM    Urobilinogen 0.2 08/07/2011 01:30 PM    Nitrites NEGATIVE  08/07/2011 01:30 PM    Leukocyte Esterase NEGATIVE  08/07/2011 01:30 PM    Epithelial cells 0-5 08/07/2011 01:30 PM    Bacteria NEGATIVE  08/07/2011 01:30 PM    WBC 0-4 08/07/2011 01:30 PM    RBC 0-3 08/07/2011 01:30 PM         Medications Reviewed:   ______________________________________________________________________  EXPECTED LENGTH OF STAY: 4d 19h  ACTUAL LENGTH OF STAY:          4                 Janessa Rubio MD

## 2021-08-07 NOTE — PROGRESS NOTES
Bedside and Verbal shift change report given to Himanshu Diane (oncoming nurse) by Minor Smith RN (offgoing nurse). Report included the following information SBAR, Kardex, ED Summary, Intake/Output, MAR, Recent Results and Cardiac Rhythm NSR.

## 2021-08-08 ENCOUNTER — APPOINTMENT (OUTPATIENT)
Dept: GENERAL RADIOLOGY | Age: 31
DRG: 871 | End: 2021-08-08
Attending: HOSPITALIST

## 2021-08-08 LAB
GLUCOSE BLD STRIP.AUTO-MCNC: 103 MG/DL (ref 65–117)
GLUCOSE BLD STRIP.AUTO-MCNC: 115 MG/DL (ref 65–117)
GLUCOSE BLD STRIP.AUTO-MCNC: 124 MG/DL (ref 65–117)
GLUCOSE BLD STRIP.AUTO-MCNC: 124 MG/DL (ref 65–117)
SERVICE CMNT-IMP: ABNORMAL
SERVICE CMNT-IMP: ABNORMAL
SERVICE CMNT-IMP: NORMAL
SERVICE CMNT-IMP: NORMAL

## 2021-08-08 PROCEDURE — 74011250636 HC RX REV CODE- 250/636: Performed by: HOSPITALIST

## 2021-08-08 PROCEDURE — 65660000001 HC RM ICU INTERMED STEPDOWN

## 2021-08-08 PROCEDURE — 74018 RADEX ABDOMEN 1 VIEW: CPT

## 2021-08-08 PROCEDURE — 74011250637 HC RX REV CODE- 250/637: Performed by: HOSPITALIST

## 2021-08-08 PROCEDURE — 82962 GLUCOSE BLOOD TEST: CPT

## 2021-08-08 PROCEDURE — C9113 INJ PANTOPRAZOLE SODIUM, VIA: HCPCS | Performed by: HOSPITALIST

## 2021-08-08 RX ORDER — ONDANSETRON 2 MG/ML
4 INJECTION INTRAMUSCULAR; INTRAVENOUS
Status: DISCONTINUED | OUTPATIENT
Start: 2021-08-08 | End: 2021-08-11 | Stop reason: HOSPADM

## 2021-08-08 RX ORDER — SIMETHICONE 80 MG
80 TABLET,CHEWABLE ORAL
Status: DISCONTINUED | OUTPATIENT
Start: 2021-08-08 | End: 2021-08-11 | Stop reason: HOSPADM

## 2021-08-08 RX ADMIN — SODIUM CHLORIDE 40 MG: 9 INJECTION, SOLUTION INTRAMUSCULAR; INTRAVENOUS; SUBCUTANEOUS at 12:05

## 2021-08-08 RX ADMIN — ENOXAPARIN SODIUM 30 MG: 40 INJECTION SUBCUTANEOUS at 22:08

## 2021-08-08 RX ADMIN — OXYCODONE HYDROCHLORIDE AND ACETAMINOPHEN 500 MG: 500 TABLET ORAL at 17:41

## 2021-08-08 RX ADMIN — ACETAMINOPHEN 650 MG: 325 TABLET ORAL at 12:05

## 2021-08-08 RX ADMIN — DEXAMETHASONE SODIUM PHOSPHATE 6 MG: 10 INJECTION, SOLUTION INTRAMUSCULAR; INTRAVENOUS at 09:35

## 2021-08-08 RX ADMIN — Medication 2000 UNITS: at 09:35

## 2021-08-08 RX ADMIN — ZINC SULFATE 220 MG (50 MG) CAPSULE 1 CAPSULE: CAPSULE at 09:35

## 2021-08-08 RX ADMIN — OXYCODONE HYDROCHLORIDE AND ACETAMINOPHEN 500 MG: 500 TABLET ORAL at 09:35

## 2021-08-08 RX ADMIN — ENOXAPARIN SODIUM 30 MG: 40 INJECTION SUBCUTANEOUS at 09:36

## 2021-08-08 RX ADMIN — ONDANSETRON 4 MG: 2 INJECTION INTRAMUSCULAR; INTRAVENOUS at 14:31

## 2021-08-08 NOTE — PROGRESS NOTES
Bedside shift change report given to Marily Mata RN (oncoming nurse) by [facility], RN (offgoing nurse). Report included the following information SBAR, Kardex and MAR.

## 2021-08-08 NOTE — PROGRESS NOTES
Verbal bedside report given to Tika Jean RN oncoming nurse by Hawk Zhao. Brendan Garcia RN off-going nurse. Report included current pt status and condition, recent results, hx of present illness, heart rate and rhythm, and respiratory status. 1300  Pt has abdominal pain with nausea, informed provider, orders received.

## 2021-08-08 NOTE — PROGRESS NOTES
Hospitalist Progress Note                               Amber Jones MD                                     Answering service: [phone]                               OR [phone] from in house phone                                         Date of Service:  2021  NAME:  Olimpia Walters  :  1990  MRN:  [id number]      Admission Summary:   Olvin Xavier is a 28 y.o. male  With no significant PMH came to the emergency room with cc of shortness of breath. Patient states that he goes to school in [place], one of his co worker was sick and since Friday patient started having fever,bodyaches,headache. He went to ER [facility]- they told him he was positive for COVID 19,he was discharged with instructions to quarantine . he came to [place] on . His symptoms progressively worsened associated  with cough and worsening shortness of breath in the last 2 days. He feels nauseous and some diarrhea as well. \"     Reason for follow up:   COVID pneumonia  Some abdominal pain  Spoke to him in length about discharge patient wants to go home for appearance in the [facility] for citizenship     Assessment & Plan:     Acute hypoxic respiratory failure due to COVID Pneumonia:   Requiring high flow oxygen--discussed with RN wean off as tolerates  Continue Dexamethasone and Remdesivir as per protocol  D Dimer 0.44. CRP 5 -> 4.8-->  trending down. Rapid COVID was negative in ER but was positive in [place] .      History of DM type 2:   Blood glucose is stable  On ISS as per protocol  Diabetic diet    Vit D Defficency  On vitamin D supplement    Transanminitis  Likely due to SARS COVID 2  Monitor    Stomach pain abdominal pain  -Possibly due to steroids causing gastric irritation will start PPI and Maalox    Diet: Diabetic  Code status: Full  DVT prophylaxis: Lovenox  Care Plan discussed with:   Discharge planning/disposition: Tbd    Hospital Problems  Date Reviewed: 8/8/2011        Codes Class Noted POA    Acute hypoxemic respiratory failure ([facility] [address] ICD-10-CM: J96.01  ICD-9-CM: 518.81  8/3/2021 Unknown        COVID-19 ICD-10-CM: U07.1  ICD-9-CM: 079.89  8/3/2021 Unknown                Review of Systems:   A comprehensive review of systems was negative except for that written in the HPI. Physical Examination:      Last 24hrs VS reviewed since prior progress note. Most recent are:  Visit Vitals  BP 93/78 (BP 1 Location: Left upper arm, BP Patient Position: At rest;Supine;Sitting)   Pulse 85   Temp 97.6 °F (36.4 °C)   Resp (!) 35   Ht 5' 4\" (1.626 m)   Wt 70.6 kg (155 lb 9.6 oz)   SpO2 91%   BMI 26.71 kg/m²           Constitutional:  No acute distress   HEENT: Head is a traumatic, anicteric, neck supple    Resp:  adventitous sounds, faint crackles, no obvious wheezing, breath sounds heard bilaterally   CV:  Regular rhythm, normal rate, no rubs    GI:  Soft, non distended, non tender. normoactive bowel sounds, no guarding       Skin  :  No cyanosis    Musculoskeletal:  No edema, warm, 2+ pulses throughout, symmetrical muscle tone    Neurologic:  AAOx3, CN II-XII reviewed. Moves all extremities.            Data Review:    Review and/or order of clinical lab test      Labs:     Recent Labs     08/06/21  0450   WBC 3.2*   HGB 14.7   HCT 45.3        Recent Labs     08/07/21  0320 08/06/21  0450   * 136   K 4.2 4.1    104   CO2 27 28   BUN 25* 25*   CREA 0.70 0.79    101*   CA 8.2* 8.4*     Recent Labs     08/07/21  0320 08/06/21  0450   * 124*   AP 81 83   TBILI 0.6 0.7   TP 7.3 7.6   ALB 2.9* 2.9*   GLOB 4.4* 4.7*       Lab Results   Component Value Date/Time    Glucose (POC) 103 08/08/2021 08:43 AM    Glucose (POC) 135 (H) 08/07/2021 09:47 PM    Glucose (POC) 121 (H) 08/07/2021 04:29 PM    Glucose (POC) 127 (H) 08/07/2021 11:38 AM    Glucose (POC) 117 08/07/2021 08:36 AM     Lab Results   Component Value Date/Time    Color YELLOW 08/07/2011 01:30 PM    Appearance CLEAR 08/07/2011 01:30 PM    Specific gravity 1.024 08/07/2011 01:30 PM    pH (UA) 6.0 08/07/2011 01:30 PM    Protein 30 (A) 08/07/2011 01:30 PM    Glucose NEGATIVE  08/07/2011 01:30 PM    Ketone NEGATIVE  08/07/2011 01:30 PM    Bilirubin NEGATIVE  08/07/2011 01:30 PM    Urobilinogen 0.2 08/07/2011 01:30 PM    Nitrites NEGATIVE  08/07/2011 01:30 PM    Leukocyte Esterase NEGATIVE  08/07/2011 01:30 PM    Epithelial cells 0-5 08/07/2011 01:30 PM    Bacteria NEGATIVE  08/07/2011 01:30 PM    WBC 0-4 08/07/2011 01:30 PM    RBC 0-3 08/07/2011 01:30 PM         Medications Reviewed:   ______________________________________________________________________  EXPECTED LENGTH OF STAY: 4d 19h  ACTUAL LENGTH OF STAY:          Wood Lewis MD

## 2021-08-08 NOTE — PROGRESS NOTES
0400:  Pt refused morning labs. Pt states that he is tired and just wants to go home. Respected pts wishes. Morning labs not obtained.

## 2021-08-09 LAB
D DIMER PPP FEU-MCNC: 0.32 MG/L FEU (ref 0–0.65)
GLUCOSE BLD STRIP.AUTO-MCNC: 106 MG/DL (ref 65–117)
GLUCOSE BLD STRIP.AUTO-MCNC: 113 MG/DL (ref 65–117)
GLUCOSE BLD STRIP.AUTO-MCNC: 123 MG/DL (ref 65–117)
GLUCOSE BLD STRIP.AUTO-MCNC: 92 MG/DL (ref 65–117)
SERVICE CMNT-IMP: ABNORMAL
SERVICE CMNT-IMP: NORMAL

## 2021-08-09 PROCEDURE — 36415 COLL VENOUS BLD VENIPUNCTURE: CPT

## 2021-08-09 PROCEDURE — 65660000001 HC RM ICU INTERMED STEPDOWN

## 2021-08-09 PROCEDURE — 77010033678 HC OXYGEN DAILY

## 2021-08-09 PROCEDURE — 74011250636 HC RX REV CODE- 250/636: Performed by: HOSPITALIST

## 2021-08-09 PROCEDURE — 74011000250 HC RX REV CODE- 250: Performed by: HOSPITALIST

## 2021-08-09 PROCEDURE — 82962 GLUCOSE BLOOD TEST: CPT

## 2021-08-09 PROCEDURE — 94760 N-INVAS EAR/PLS OXIMETRY 1: CPT

## 2021-08-09 PROCEDURE — 74011250637 HC RX REV CODE- 250/637: Performed by: HOSPITALIST

## 2021-08-09 PROCEDURE — C9113 INJ PANTOPRAZOLE SODIUM, VIA: HCPCS | Performed by: HOSPITALIST

## 2021-08-09 PROCEDURE — 85379 FIBRIN DEGRADATION QUANT: CPT

## 2021-08-09 PROCEDURE — 77010033711 HC HIGH FLOW OXYGEN

## 2021-08-09 RX ORDER — PANTOPRAZOLE SODIUM 40 MG/1
40 TABLET, DELAYED RELEASE ORAL
Status: DISCONTINUED | OUTPATIENT
Start: 2021-08-10 | End: 2021-08-11 | Stop reason: HOSPADM

## 2021-08-09 RX ADMIN — OXYCODONE HYDROCHLORIDE AND ACETAMINOPHEN 500 MG: 500 TABLET ORAL at 10:37

## 2021-08-09 RX ADMIN — ZINC SULFATE 220 MG (50 MG) CAPSULE 1 CAPSULE: CAPSULE at 10:37

## 2021-08-09 RX ADMIN — SODIUM CHLORIDE 40 MG: 9 INJECTION, SOLUTION INTRAMUSCULAR; INTRAVENOUS; SUBCUTANEOUS at 10:38

## 2021-08-09 RX ADMIN — OXYCODONE HYDROCHLORIDE AND ACETAMINOPHEN 500 MG: 500 TABLET ORAL at 18:23

## 2021-08-09 RX ADMIN — ENOXAPARIN SODIUM 30 MG: 40 INJECTION SUBCUTANEOUS at 21:05

## 2021-08-09 RX ADMIN — Medication 2000 UNITS: at 10:37

## 2021-08-09 RX ADMIN — ENOXAPARIN SODIUM 30 MG: 40 INJECTION SUBCUTANEOUS at 10:39

## 2021-08-09 RX ADMIN — DEXAMETHASONE SODIUM PHOSPHATE 6 MG: 10 INJECTION, SOLUTION INTRAMUSCULAR; INTRAVENOUS at 10:37

## 2021-08-09 NOTE — PROGRESS NOTES
TRANSITIONS OF CARE PLAN:   RUR: 7%  Disposition- - likely home with [facility]  TRANSPORT: Family  ANTICIPATED FOLLOW UPS: Needs a new PCP appt/Specialist    Patient has no health insurance/no PCP. CM to notify CM Specialist to schedule a new PCP appt prior to discharge. Patient may need home O2 if unable to wean off. CM will continue to follow for any discharge needs that may occur.      Antolin Webber MSA, RN, CRM

## 2021-08-09 NOTE — PROGRESS NOTES
Bedside shift change report given to Douglas Estevez RN (oncoming nurse) by Saeed Toth RN (offgoing nurse). Report included the following information SBAR, Kardex, ED Summary, Intake/Output, MAR, Recent Results and Cardiac Rhythm NSR. Bedside shift change report given to Leydi Damian RN and Vinicius Plascencia RN (oncoming nurse) by Douglas Estevez RN (offgoing nurse). Report included the following information SBAR, Kardex, ED Summary, Intake/Output, MAR, Recent Results and Cardiac Rhythm NSR.

## 2021-08-09 NOTE — PROGRESS NOTES
Clinical Pharmacy Note: IV to PO Automatic Conversion  Please note: Cesar Becker medications- dexamethasone and pantoprazole have been changed from IV to PO based on the following critiera:    Patient is tolerating oral medications  Patient is tolerating a diet more advanced than clear liquids  Patient is not requiring vasopressors    This IV to PO conversion is based on the P&[employer] approved automatic conversion policy for eligible patients. Please call with questions.

## 2021-08-09 NOTE — PROGRESS NOTES
[facility] [facility] Adult  Hospitalist Group                                                                                          Hospitalist Progress Note  Raul Bravo MD  Answering service: [phone] OR [phone] from in house phone        Date of Service:  2021  NAME:  Med Minaya  :  1990  MRN:  [id number]      Admission Summary:   Carmelina ly 28 y. o. male  With no significant PMH came to the emergency room with cc of shortness of breath.    Patient states that he goes to school in [facility], one of his co worker was sick and since Friday patient started having fever,bodyaches,headache. He went to ER [facility]- they told him he was positive for COVID 19,he was discharged with instructions to quarantine . he came to [place] on . His symptoms progressively worsened associated  with cough and worsening shortness of breath in the last 2 days. He feels nauseous and some diarrhea as well. Interval history / Subjective:   Patient seen and examined he is worried about being discharged to go to [facility] to follow-up with immigration for his fingerprinting currently saturating well on 4 to 5 L nasal cannula  He plans to fly to [facility] discussed with him that he need to be isolated after being discharged from the hospital for 10 days and I am willing to give him paperwork to reschedule his fingerprinting     Assessment & Plan:     Acute hypoxic respiratory failure due to COVID Pneumonia:   Requiring high flow oxygen--discussed with RN wean off as tolerates  Continue Dexamethasone and Remdesivir as per protocol  D Dimer 0.44. CRP 5 -> 4.8-->  trending down.    Rapid COVID was negative in ER but was positive in [place] [facility]   It may be necessary to test Covid before discharge for public safety     History of DM type 2:   Blood glucose is stable  On ISS as per protocol  Diabetic diet     Vit D Defficency  On vitamin D supplement     Transanminitis  Likely due to SARS COVID 2  Monitor     Stomach pain abdominal pain  -Possibly due to steroids causing gastric irritation will start PPI and Maalox       Code status: Full code  DVT prophylaxis: Lovenox per Covid protocol    Care Plan discussed with: Patient/Family and Nurse  Anticipated Disposition: Home w/Family  Anticipated Discharge: 24 hours to 48 hours     Hospital Problems  Date Reviewed: 8/8/2011        Codes Class Noted POA    Acute hypoxemic respiratory failure ([facility] [address] ICD-10-CM: J96.01  ICD-9-CM: 518.81  8/3/2021 Unknown        COVID-19 ICD-10-CM: U07.1  ICD-9-CM: 079.89  8/3/2021 Unknown                Review of Systems:   A comprehensive review of systems was negative. Vital Signs:    Last 24hrs VS reviewed since prior progress note. Most recent are:  Visit Vitals  BP 96/63 (BP 1 Location: Left arm, BP Patient Position: At rest)   Pulse 64   Temp 97.7 °F (36.5 °C)   Resp 21   Ht 5' 4\" (1.626 m)   Wt 70.6 kg (155 lb 9.6 oz)   SpO2 93%   BMI 26.71 kg/m²         Intake/Output Summary (Last 24 hours) at 8/9/2021 1305  Last data filed at 8/8/2021 2200  Gross per 24 hour   Intake 0 ml   Output 350 ml   Net -350 ml        Physical Examination:     I had a face to face encounter with this patient and independently examined them on 8/9/2021 as outlined below:          Constitutional:  No acute distress, cooperative, pleasant    ENT:  Oral mucosa moist, oropharynx benign. Resp:  CTA bilaterally. No wheezing/rhonchi/rales. No accessory muscle use   CV:  Regular rhythm, normal rate, no murmurs, gallops, rubs    GI:  Soft, non distended, non tender. normoactive bowel sounds, no hepatosplenomegaly     Musculoskeletal:  No edema, warm, 2+ pulses throughout    Neurologic:  Moves all extremities. AAOx3, CN II-XII reviewed            Data Review:    I personally reviewed  Image and labs      Labs:   No results for input(s): WBC, HGB, HCT, PLT, HGBEXT, HCTEXT, PLTEXT in the last 72 hours.   Recent Labs     08/07/21  0320   *   K 4.2      CO2 27   BUN 25*   CREA 0.70      CA 8.2*     Recent Labs     08/07/21  0320   *   AP 81   TBILI 0.6   TP 7.3   ALB 2.9*   GLOB 4.4*     No results for input(s): INR, PTP, APTT, INREXT in the last 72 hours. No results for input(s): FE, TIBC, PSAT, FERR in the last 72 hours. No results found for: FOL, RBCF   No results for input(s): PH, PCO2, PO2 in the last 72 hours. No results for input(s): CPK, CKNDX, TROIQ in the last 72 hours.     No lab exists for component: CPKMB  No results found for: CHOL, CHOLX, CHLST, CHOLV, HDL, HDLP, LDL, LDLC, DLDLP, TGLX, TRIGL, TRIGP, CHHD, CHHDX  Lab Results   Component Value Date/Time    Glucose (POC) 106 08/09/2021 11:16 AM    Glucose (POC) 92 08/09/2021 08:17 AM    Glucose (POC) 115 08/08/2021 09:14 PM    Glucose (POC) 124 (H) 08/08/2021 04:53 PM    Glucose (POC) 124 (H) 08/08/2021 11:50 AM     Lab Results   Component Value Date/Time    Color YELLOW 08/07/2011 01:30 PM    Appearance CLEAR 08/07/2011 01:30 PM    Specific gravity 1.024 08/07/2011 01:30 PM    pH (UA) 6.0 08/07/2011 01:30 PM    Protein 30 (A) 08/07/2011 01:30 PM    Glucose NEGATIVE  08/07/2011 01:30 PM    Ketone NEGATIVE  08/07/2011 01:30 PM    Bilirubin NEGATIVE  08/07/2011 01:30 PM    Urobilinogen 0.2 08/07/2011 01:30 PM    Nitrites NEGATIVE  08/07/2011 01:30 PM    Leukocyte Esterase NEGATIVE  08/07/2011 01:30 PM    Epithelial cells 0-5 08/07/2011 01:30 PM    Bacteria NEGATIVE  08/07/2011 01:30 PM    WBC 0-4 08/07/2011 01:30 PM    RBC 0-3 08/07/2011 01:30 PM         Medications Reviewed:     Current Facility-Administered Medications   Medication Dose Route Frequency    [START ON 8/10/2021] dexAMETHasone (DECADRON) tablet 6 mg  6 mg Oral DAILY    [START ON 8/10/2021] pantoprazole (PROTONIX) tablet 40 mg  40 mg Oral ACB    aluminum-magnesium hydroxide (MAALOX) oral suspension 15 mL  15 mL Oral QID PRN    ondansetron (ZOFRAN) injection 4 mg  4 mg IntraVENous Q6H PRN    simethicone (MYLICON) tablet 80 mg  80 mg Oral QID PRN    guaiFENesin-dextromethorphan (ROBITUSSIN DM) 100-10 mg/5 mL syrup 10 mL  10 mL Oral Q6H PRN    cholecalciferol (VITAMIN D3) (1000 Units /25 mcg) tablet 2,000 Units  2,000 Units Oral DAILY    insulin lispro (HUMALOG) injection   SubCUTAneous AC&HS    glucose chewable tablet 16 g  4 Tablet Oral PRN    dextrose (D50W) injection syrg 12.5-25 g  12.5-25 g IntraVENous PRN    glucagon (GLUCAGEN) injection 1 mg  1 mg IntraMUSCular PRN    enoxaparin (LOVENOX) injection 30 mg  30 mg SubCUTAneous Q12H    acetaminophen (TYLENOL) tablet 650 mg  650 mg Oral Q4H PRN    ascorbic acid (vitamin C) (VITAMIN C) tablet 500 mg  500 mg Oral BID    zinc sulfate (ZINCATE) 50 mg zinc (220 mg) capsule 1 Capsule  1 Capsule Oral DAILY     ______________________________________________________________________  EXPECTED LENGTH OF STAY: 4d 19h  ACTUAL LENGTH OF STAY:          6                 Ashok Valdivia MD

## 2021-08-10 PROBLEM — J96.01 ACUTE HYPOXEMIC RESPIRATORY FAILURE (HCC): Status: RESOLVED | Noted: 2021-08-03 | Resolved: 2021-08-10

## 2021-08-10 LAB
D DIMER PPP FEU-MCNC: 0.26 MG/L FEU (ref 0–0.65)
GLUCOSE BLD STRIP.AUTO-MCNC: 106 MG/DL (ref 65–117)
GLUCOSE BLD STRIP.AUTO-MCNC: 118 MG/DL (ref 65–117)
GLUCOSE BLD STRIP.AUTO-MCNC: 137 MG/DL (ref 65–117)
GLUCOSE BLD STRIP.AUTO-MCNC: 92 MG/DL (ref 65–117)
SERVICE CMNT-IMP: ABNORMAL
SERVICE CMNT-IMP: ABNORMAL
SERVICE CMNT-IMP: NORMAL
SERVICE CMNT-IMP: NORMAL

## 2021-08-10 PROCEDURE — 36415 COLL VENOUS BLD VENIPUNCTURE: CPT

## 2021-08-10 PROCEDURE — 74011250637 HC RX REV CODE- 250/637: Performed by: HOSPITALIST

## 2021-08-10 PROCEDURE — 74011250636 HC RX REV CODE- 250/636: Performed by: HOSPITALIST

## 2021-08-10 PROCEDURE — 85379 FIBRIN DEGRADATION QUANT: CPT

## 2021-08-10 PROCEDURE — 65660000001 HC RM ICU INTERMED STEPDOWN

## 2021-08-10 PROCEDURE — 82962 GLUCOSE BLOOD TEST: CPT

## 2021-08-10 RX ORDER — PREDNISONE 10 MG/1
TABLET ORAL
Qty: 14 TABLET | Refills: 0 | Status: SHIPPED | OUTPATIENT
Start: 2021-08-10

## 2021-08-10 RX ORDER — GUAIFENESIN/DEXTROMETHORPHAN 100-10MG/5
10 SYRUP ORAL
Qty: 1 BOTTLE | Refills: 0 | Status: SHIPPED | OUTPATIENT
Start: 2021-08-10 | End: 2021-08-20

## 2021-08-10 RX ADMIN — Medication 2000 UNITS: at 09:10

## 2021-08-10 RX ADMIN — ZINC SULFATE 220 MG (50 MG) CAPSULE 1 CAPSULE: CAPSULE at 09:10

## 2021-08-10 RX ADMIN — OXYCODONE HYDROCHLORIDE AND ACETAMINOPHEN 500 MG: 500 TABLET ORAL at 09:10

## 2021-08-10 RX ADMIN — ENOXAPARIN SODIUM 30 MG: 40 INJECTION SUBCUTANEOUS at 09:10

## 2021-08-10 RX ADMIN — PANTOPRAZOLE SODIUM 40 MG: 40 TABLET, DELAYED RELEASE ORAL at 07:19

## 2021-08-10 RX ADMIN — DEXAMETHASONE 6 MG: 4 TABLET ORAL at 09:10

## 2021-08-10 RX ADMIN — OXYCODONE HYDROCHLORIDE AND ACETAMINOPHEN 500 MG: 500 TABLET ORAL at 17:42

## 2021-08-10 RX ADMIN — ENOXAPARIN SODIUM 30 MG: 40 INJECTION SUBCUTANEOUS at 21:08

## 2021-08-10 NOTE — PROGRESS NOTES
Verbal bedside report given to Jensen Nunn RN oncoming nurse by Steph Szymanski, RN  and Thang Myers RN off-going nurse. Report included current pt status and condition, recent results, hx of present illness, heart rate and rhythm, and respiratory status.

## 2021-08-10 NOTE — PROGRESS NOTES
BOAZ: anticipate d/c home; Pt stated he intends to d/c to his home in [place], [place]; Pt stated he called health insurance company and that it is out of network with [facility] [place] and he will have to submit bills for reimbursement; Pt stated he has a PCP appt already with Jackqueline Hammans for 8/23 & pt insisted that he will arrange his own PCP appt after d/c; Pt is Covid Positive; Pt will need 02 @ 4L/NC @ d/c; Pt stated he cannot afford 02-CM escalated his concerns to CM Manager; Referral pending to [facility] for pt's 02 needs; Family Transport    RUR: 9%    0900-CM reviewed pt chart & noted d/c order. CM spoke with Nurse who advised they are weaning pt's 02 down to 3L/NC & will assess. CM contacted pt via Room phone in lieu of Covid islolation and pt stated he intends to d/c home with his family and does have health insurance through his employer at [employer]. He further stated his health insurance provider is [employer]. He stated he has his insurance card in his wallet. Nurse went into pt's room and pt then stated he does not have the insurance card and that he would call the insurance company to obtain the ID #, Group # and contact information. CM to follow on insurance. CM did discuss out of pocket O2 costs and pt stated he would like to see if he can get it covered through his health insurance first. Pt stated family will provide d/c transport. CM to follow. 1000-CM contacted pt via Room phone and pt advised he contacted insurance company and they told him that he is out of network and would have to submit his bills for reimbursement. He would like to proceed with private pay for 02 and has no preference of provider. Cm stated he can afford $100-125 for home 02. CM left VM for Peter of [facility] and also spoke with Josephine Henderson of [facility] [phone] and confirmed fax #. Cm faxed referral to [facility] at [phone]. CM informed Josephine Henderson of pt's room # to facilitate delivery and payment.  CM informed nurse of plan. Cm also informed pt of plan. 1200-CM was approached by [facility] liaison, Ty who advised that pt stated he cannot afford O2 now. CM contacted pt and pt stated he does not have enough money to afford 02 despite informing CM earlier that he could afford it. Pt further advised that he has a [employer] coming up and does not want to miss it. CM explained that pt would need to isolated for 10 days and to provide them with a Doctor's Note to reschedule appt. Cm escalated pt's concerns to CM Manager. Cm also informed nurse & sent perfect serve message to Attending. CM to follow. 5705-CM sent email to Christinadread  and Clinical Lead with completed [id number] [facility] [employer] form for possible financial assistance with pt's d/c 02 needs.    Joy Murphy RN BSN CCM

## 2021-08-10 NOTE — DISCHARGE SUMMARY
Discharge Summary       PATIENT ID: Gurjit Gibbons  MRN: [id number]   YOB: 1990    DATE OF ADMISSION: 8/3/2021  5:10 AM    DATE OF DISCHARGE: 8/10/21   PRIMARY CARE PROVIDER: None     ATTENDING PHYSICIAN: Jarad Armstrong  DISCHARGING PROVIDER: Lonnie Cartwright MD    To contact this individual call [phone] and ask the  to page. If unavailable ask to be transferred the Adult Hospitalist Department. CONSULTATIONS: IP CONSULT TO HOSPITALIST  IP CONSULT TO PULMONOLOGY    PROCEDURES/SURGERIES: * No surgery found *    ADMITTING DIAGNOSES & HOSPITAL COURSE:   Avila ly 28 y. o. male  With no significant PMH came to the emergency room with cc of shortness of breath.    Patient states that he goes to school in [place], one of his co worker was sick and since Friday patient started having fever,bodyaches,headache. He went to [facility]- they told him he was positive for COVID 19,he was discharged with instructions to quarantine . he came to [address] [facility] on Sunday. His symptoms progressively worsened associated  with cough and worsening shortness of breath in the last 2 days. He feels nauseous and some diarrhea as well.      Assessment & Plan:      Acute hypoxic respiratory failure due to COVID Pneumonia:   Sterid taper given at d/c     History of DM type 2:   Blood glucose is stable     Vit D Defficency  On vitamin D supplement     Transanminitis  Likely due to SARS COVID 2              DISCHARGE DIAGNOSES / PLAN:      1. D/c home with oxygen, advised not to take public transportation to go to [place] due to public safety concerns     ADDITIONAL CARE RECOMMENDATIONS:        PENDING TEST RESULTS:   At the time of discharge the following test results are still pending:     FOLLOW UP APPOINTMENTS:    Follow-up Information     Follow up With Specialties Details Why Contact Info       need a PCP f/u in 1 week     None    None (395) Patient stated that they have no PCP               DIET: Regular Diet    ACTIVITY: Activity as tolerated    WOUND CARE:     EQUIPMENT needed:       DISCHARGE MEDICATIONS:  Current Discharge Medication List      START taking these medications    Details   predniSONE (DELTASONE) 10 mg tablet 4 tabs daily for 2 days then drop to   2 tabs daily for 2 days then drop to   1 tab daily for 2 days then stop. Qty: 14 Tablet, Refills: 0  Start date: 8/10/2021      guaiFENesin-dextromethorphan (ROBITUSSIN DM) 100-10 mg/5 mL syrup Take 10 mL by mouth every six (6) hours as needed for Cough or Congestion for up to 10 days. Qty: 1 Bottle, Refills: 0  Start date: 8/10/2021, End date: 8/20/2021               NOTIFY YOUR PHYSICIAN FOR ANY OF THE FOLLOWING:   Fever over 101 degrees for 24 hours. Chest pain, shortness of breath, fever, chills, nausea, vomiting, diarrhea, change in mentation, falling, weakness, bleeding. Severe pain or pain not relieved by medications. Or, any other signs or symptoms that you may have questions about. DISPOSITION:   x Home With:   OT  PT  HH  RN       Long term SNF/Inpatient Rehab    Independent/assisted living    Hospice    Other:       PATIENT CONDITION AT DISCHARGE:     Functional status    Poor    x Deconditioned     Independent      Cognition   x  Lucid     Forgetful     Dementia      Catheters/lines (plus indication)    Garcia     PICC     PEG    x None      Code status   x  Full code     DNR      PHYSICAL EXAMINATION AT DISCHARGE:  General:          Alert, cooperative, no distress, appears stated age. HEENT:           Atraumatic, anicteric sclerae, pink conjunctivae                          No oral ulcers, mucosa moist, throat clear, dentition fair  Neck:               Supple, symmetrical  Lungs:             Clear to auscultation bilaterally. No Wheezing or Rhonchi. No rales. Chest wall:      No tenderness  No Accessory muscle use. Heart:              Regular  rhythm,  No  murmur   No edema  Abdomen:        Soft, non-tender. Not distended.   Bowel sounds normal  Extremities:     No cyanosis. No clubbing,                            Skin turgor normal, Capillary refill normal  Skin:                Not pale. Not Jaundiced  No rashes   Psych:             Not anxious or agitated.   Neurologic:      Alert, moves all extremities, answers questions appropriately and responds to commands       CHRONIC MEDICAL DIAGNOSES:  Problem List as of 8/10/2021 Date Reviewed: 8/8/2011        Codes Class Noted - Resolved    COVID-19 ICD-10-CM: U07.1  ICD-9-CM: 079.89  8/3/2021 - Present        Psychosis NOS ICD-10-CM: F29  ICD-9-CM: 298.9  8/8/2011 - Present        Adjustment disorder with depressed mood ICD-10-CM: F43.21  ICD-9-CM: 309.0  8/8/2011 - Present        RESOLVED: [facility] hypoxemic [facility] ([facility] ICD-10-CM: J96.01  ICD-9-CM: 518.81  8/3/2021 - 8/10/2021              Greater than 30  minutes were spent with the patient on counseling and coordination of care    Signed:   Ashok Valdivia MD  8/10/2021  9:05 AM

## 2021-08-10 NOTE — DISCHARGE INSTRUCTIONS
Advance Care Planning  People with COVID-19 may have no symptoms, mild symptoms, such as fever, cough, and shortness of breath or they may have more severe illness, developing severe and fatal pneumonia. As a result, Advance Care Planning with attention to naming a health care decision maker (someone you trust to make healthcare decisions for you if you could not speak for yourself) and sharing other health care preferences is important BEFORE a possible health crisis. Please contact your Primary Care Provider to discuss Advance Care Planning. Preventing the Spread of Coronavirus Disease 2019 in Homes and Residential Communities  For the most recent information go to [email or web address]    Prevention steps for People with confirmed or suspected COVID-19 (including persons under investigation) who do not need to be hospitalized  and   People with confirmed COVID-19 who were hospitalized and determined to be medically stable to go home    Your healthcare provider and public health staff will evaluate whether you can be cared for at home. If it is determined that you do not need to be hospitalized and can be isolated at home, you will be monitored by staff from your local or state health department. You should follow the prevention steps below until a healthcare provider or local or state health department says you can return to your normal activities. Stay home except to get medical care  People who are mildly ill with COVID-19 are able to isolate at home during their illness. You should restrict activities outside your home, except for getting medical care. Do not go to work, school, or public areas. Avoid using public transportation, ride-sharing, or taxis. Separate yourself from other people and animals in your home  People: As much as possible, you should stay in a specific room and away from other people in your home.  Also, you should use a separate bathroom, if available. Animals: You should restrict contact with pets and other animals while you are sick with COVID-19, just like you would around other people. Although there have not been reports of pets or other animals becoming sick with COVID-19, it is still recommended that people sick with COVID-19 limit contact with animals until more information is known about the virus. When possible, have another member of your household care for your animals while you are sick. If you are sick with COVID-19, avoid contact with your pet, including petting, snuggling, being kissed or licked, and sharing food. If you must care for your pet or be around animals while you are sick, wash your hands before and after you interact with pets and wear a facemask. Call ahead before visiting your doctor  If you have a medical appointment, call the healthcare provider and tell them that you have or may have COVID-19. This will help the healthcare providers office take steps to keep other people from getting infected or exposed. Wear a facemask  You should wear a facemask when you are around other people (e.g., sharing a room or vehicle) or pets and before you enter a healthcare providers office. If you are not able to wear a facemask (for example, because it causes trouble breathing), then people who live with you should not stay in the same room with you, or they should wear a facemask if they enter your room. Cover your coughs and sneezes  Cover your mouth and nose with a tissue when you cough or sneeze. Throw used tissues in a lined trash can. Immediately wash your hands with soap and water for at least 20 seconds or, if soap and water are not available, clean your hands with an alcohol-based hand  that contains at least 60% alcohol.   Clean your hands often  Wash your hands often with soap and water for at least 20 seconds, especially after blowing your nose, coughing, or sneezing; going to the bathroom; and before eating or preparing food. If soap and water are not readily available, use an alcohol-based hand  with at least 60% alcohol, covering all surfaces of your hands and rubbing them together until they feel dry. Soap and water are the best option if hands are visibly dirty. Avoid touching your eyes, nose, and mouth with unwashed hands. Avoid sharing personal household items  You should not share dishes, drinking glasses, cups, eating utensils, towels, or bedding with other people or pets in your home. After using these items, they should be washed thoroughly with soap and water. Clean all high-touch surfaces everyday  High touch surfaces include counters, tabletops, doorknobs, bathroom fixtures, toilets, phones, keyboards, tablets, and bedside tables. Also, clean any surfaces that may have blood, stool, or body fluids on them. Use a household cleaning spray or wipe, according to the label instructions. Labels contain instructions for safe and effective use of the cleaning product including precautions you should take when applying the product, such as wearing gloves and making sure you have good ventilation during use of the product. Monitor your symptoms  Seek prompt medical attention if your illness is worsening (e.g., difficulty breathing). Before seeking care, call your healthcare provider and tell them that you have, or are being evaluated for, COVID-19. Put on a facemask before you enter the facility. These steps will help the healthcare providers office to keep other people in the office or waiting room from getting infected or exposed. Ask your healthcare provider to call the local or state health department. Persons who are placed under active monitoring or facilitated self-monitoring should follow instructions provided by their local health department or occupational health professionals, as appropriate. When working with your local health department check their available hours.   If you have a medical emergency and need to call 911, notify the dispatch personnel that you have, or are being evaluated for COVID-19. If possible, put on a facemask before emergency medical services arrive. Discontinuing home isolation  Patients with confirmed COVID-19 should remain under home isolation precautions until the risk of secondary transmission to others is thought to be low. The decision to discontinue home isolation precautions should be made on a case-by-case basis, in consultation with healthcare providers and [facility] and local health departments. I advised the patient not to travel on aircraft or by train and if he intends to go to [facility] should be using personal transportation like a car       Discharge Instructions       PATIENT ID: Lupillo Tidwell  MRN: [id number]   YOB: 1990    DATE OF ADMISSION: 8/3/2021  5:10 AM    DATE OF DISCHARGE: 8/10/2021    PRIMARY CARE PROVIDER: None     ATTENDING PHYSICIAN: Pippa Modi MD  DISCHARGING PROVIDER: Gibson Apodaca MD    To contact this individual call [phone] and ask the  to page. If unavailable ask to be transferred the Adult Hospitalist Department. DISCHARGE DIAGNOSES COVID 19    CONSULTATIONS: IP CONSULT TO HOSPITALIST  IP CONSULT TO PULMONOLOGY    PROCEDURES/SURGERIES: * No surgery found *    PENDING TEST RESULTS:   At the time of discharge the following test results are still pending:     FOLLOW UP APPOINTMENTS:   Follow-up Information     Follow up With Specialties Details Why Contact Info       need a PCP f/u in 1 week            ADDITIONAL CARE RECOMMENDATIONS:     DIET: Regular Diet    ACTIVITY: Activity as tolerated    WOUND CARE:     EQUIPMENT needed:       Radiology      DISCHARGE MEDICATIONS:   See Medication Reconciliation Form    · It is important that you take the medication exactly as they are prescribed.    · Keep your medication in the bottles provided by the pharmacist and keep a list of the medication names, dosages, and times to be taken in your wallet. · Do not take other medications without consulting your doctor. NOTIFY YOUR PHYSICIAN FOR ANY OF THE FOLLOWING:   Fever over 101 degrees for 24 hours. Chest pain, shortness of breath, fever, chills, nausea, vomiting, diarrhea, change in mentation, falling, weakness, bleeding. Severe pain or pain not relieved by medications. Or, any other signs or symptoms that you may have questions about. DISPOSITION:  X  Home With:   OT  PT  HH  RN       SNF/Inpatient Rehab/LTAC    Independent/assisted living    Hospice    Other:     CDMP Checked: Yes X     PROBLEM LIST Updated:   Yes X       Signed:   William Louis MD  8/10/2021  8:35 AM

## 2021-08-10 NOTE — PROGRESS NOTES
[address] August 10, 2021       RE: Goldy Scales      To Whom It May Concern,    This is to certify that Goldy Scales was admitted with COVID 19 PNA from 8/3/21- 8/10/21 . Please feel free to contact my office if you have any questions or concerns. Thank you for your assistance in this matter.       Sincerely,  Sarahi Fitch MD

## 2021-08-10 NOTE — PROGRESS NOTES
Pt more alert and upbeat today, with O2 sats stable on 4L NC. States appetite has returned, and requests multiple snacks overnight. Pt says he is \"ready to go home\". Bedside shift change report given to Ev Barrientos RN and Oj Hunter RN (oncoming nurse) by Lukasz Garcia RN (offgoing nurse). Report included the following information SBAR, Kardex, ED Summary, Intake/Output, MAR, Recent Results and Cardiac Rhythm NSR.

## 2021-08-11 VITALS
SYSTOLIC BLOOD PRESSURE: 91 MMHG | OXYGEN SATURATION: 91 % | DIASTOLIC BLOOD PRESSURE: 69 MMHG | BODY MASS INDEX: 25.81 KG/M2 | TEMPERATURE: 98 F | HEART RATE: 72 BPM | RESPIRATION RATE: 18 BRPM | HEIGHT: 64 IN | WEIGHT: 151.2 LBS

## 2021-08-11 LAB
GLUCOSE BLD STRIP.AUTO-MCNC: 92 MG/DL (ref 65–117)
SERVICE CMNT-IMP: NORMAL

## 2021-08-11 PROCEDURE — 82962 GLUCOSE BLOOD TEST: CPT

## 2021-08-11 PROCEDURE — 74011250637 HC RX REV CODE- 250/637: Performed by: HOSPITALIST

## 2021-08-11 PROCEDURE — 74011250636 HC RX REV CODE- 250/636: Performed by: HOSPITALIST

## 2021-08-11 RX ADMIN — PANTOPRAZOLE SODIUM 40 MG: 40 TABLET, DELAYED RELEASE ORAL at 06:55

## 2021-08-11 RX ADMIN — ZINC SULFATE 220 MG (50 MG) CAPSULE 1 CAPSULE: CAPSULE at 11:12

## 2021-08-11 RX ADMIN — Medication 2000 UNITS: at 11:12

## 2021-08-11 RX ADMIN — OXYCODONE HYDROCHLORIDE AND ACETAMINOPHEN 500 MG: 500 TABLET ORAL at 11:12

## 2021-08-11 RX ADMIN — ENOXAPARIN SODIUM 30 MG: 40 INJECTION SUBCUTANEOUS at 11:12

## 2021-08-11 RX ADMIN — DEXAMETHASONE 6 MG: 4 TABLET ORAL at 11:12

## 2021-08-11 NOTE — PROGRESS NOTES
BOAZ: anticipate d/c home; [facility] financial assistance approved for one month 02 supply; 02 to be delivered today by [facility]; Pt is Covid positive; Pt claims his insurance provider is out of network; Pt intends to schedule own PCP appt at d/c; Family transport    RUR: 10%    0900-NINFA reviewed pt chart & was approached by CM Manager advising that pt's [address] [place] [address] financial assistance application remains pending at this time. CM to follow. 1015-NINFA was informed by CM Manager that [facility] was paid by [address] [place] [address] for pt's one month 02 supply. CM contacted Ty of [facility] [phone] and he is on way to deliver pt's 02 to pt room. Once pt is home, [facility] will deliver the remainder of the 02 supply as per Ty report, as the patient advised no one is home at this time. CM contacted pt and he intends to have family provide d/c transport or Uber. CM to follow. [facility]-Nurse provided CM with copy of patient's insurance information from [facility]. The information provided was a letter detailing that pt is enrolled in their plan. Copy of letter was emailed to Clinical Lead to update for insurance on record. Nurse confirmed that pt's 02 was delivered to pt room and additional tanks to be delivered once pt returns home. CM to follow.   Kike Lobo RN BSN CCM

## 2021-08-11 NOTE — PROGRESS NOTES
08/11/21 1157   Oxygen Therapy   O2 Sat (%) 91 %   Pulse via Oximetry 94 beats per minute   O2 Device None (Room air)   FIO2 (%) 21 %   Patient took himself of oxygen this morning stating he does not need oxygen it makes him irritated.

## 2021-08-11 NOTE — PROGRESS NOTES
Pt has insurance paperwork he would like Case Management to see. At bedside in his backpack. 0330 - Lab called to say D-dimer needs to be redrawn. Pt refused repeat lab draw. NP notified. Bedside shift change report given to LAURA Solitario (oncoming nurse) by Felicia Ashley RN (offgoing nurse). Report included the following information SBAR, Kardex, ED Summary, Intake/Output, MAR, Recent Results and Cardiac Rhythm NSR/ Sinus Rhythm.

## 2021-08-11 NOTE — PROGRESS NOTES
[facility] Adult  Hospitalist Group                                                                                          Hospitalist Progress Note  Lonnie Cartwright MD  Answering service: [phone] OR [phone] from in house phone        Date of Service:  2021  NAME:  Dani Edwige  :  1990  MRN:  [id number]      Admission Summary:   Avila Rees a 28 y. o. male  With no significant PMH came to the emergency room with cc of shortness of breath.    Patient states that he goes to school in [facility], one of his co worker was sick and since Friday patient started having fever,bodyaches,headache. He went to ER [facility]- they told him he was positive for COVID 19,he was discharged with instructions to quarantine . he came to [place] on . His symptoms progressively worsened associated  with cough and worsening shortness of breath in the last 2 days. He feels nauseous and some diarrhea as well. Interval history / Subjective:   Awaiting oxygen arrangements can be d/c'd today  D/w CM     Assessment & Plan:     Acute hypoxic respiratory failure due to COVID Pneumonia:   Requiring high flow oxygen--discussed with RN wean off as tolerates  Continue Dexamethasone and Remdesivir as per protocol  D Dimer 0.44. CRP 5 -> 4.8-->  trending down.    Rapid COVID was negative in ER but was positive in [facility]   It may be necessary to test Covid before discharge for public safety     History of DM type 2:   Blood glucose is stable  On ISS as per protocol  Diabetic diet     Vit D Defficency  On vitamin D supplement     Transanminitis  Likely due to SARS COVID 2  Monitor     Stomach pain abdominal pain  -Possibly due to steroids causing gastric irritation will start PPI and Maalox       Code status: Full code  DVT prophylaxis: Lovenox per Covid protocol    Care Plan discussed with: Patient/Family and Nurse  Anticipated Disposition: Home w/Family  Anticipated Discharge: 24 hours to 48 hours     Hospital Problems  Date Reviewed: 8/8/2011        Codes Class Noted POA    COVID-19 ICD-10-CM: U07.1  ICD-9-CM: 079.89  8/3/2021 Unknown                Review of Systems:   A comprehensive review of systems was negative. Vital Signs:    Last 24hrs VS reviewed since prior progress note. Most recent are:  Visit Vitals  BP 91/69 (BP 1 Location: Left upper arm, BP Patient Position: At rest)   Pulse 69   Temp 98 °F (36.7 °C)   Resp 18   Ht 5' 4\" (1.626 m)   Wt 68.6 kg (151 lb 3.2 oz)   SpO2 98%   BMI 25.95 kg/m²         Intake/Output Summary (Last 24 hours) at 8/11/2021 0852  Last data filed at 8/11/2021 0847  Gross per 24 hour   Intake    Output 775 ml   Net -775 ml        Physical Examination:     I had a face to face encounter with this patient and independently examined them on 8/11/2021 as outlined below:          Constitutional:  No acute distress, cooperative, pleasant    ENT:  Oral mucosa moist, oropharynx benign. Resp:  CTA bilaterally. No wheezing/rhonchi/rales. No accessory muscle use   CV:  Regular rhythm, normal rate, no murmurs, gallops, rubs    GI:  Soft, non distended, non tender. normoactive bowel sounds, no hepatosplenomegaly     Musculoskeletal:  No edema, warm, 2+ pulses throughout    Neurologic:  Moves all extremities. AAOx3, CN II-XII reviewed            Data Review:    I personally reviewed  Image and labs      Labs:   No results for input(s): WBC, HGB, HCT, PLT, HGBEXT, HCTEXT, PLTEXT, HGBEXT, HCTEXT, PLTEXT in the last 72 hours. No results for input(s): NA, K, CL, CO2, BUN, CREA, GLU, CA, MG, PHOS, URICA in the last 72 hours. No results for input(s): ALT, AP, TBIL, TBILI, TP, ALB, GLOB, GGT, AML, LPSE in the last 72 hours. No lab exists for component: SGOT, GPT, AMYP, HLPSE  No results for input(s): INR, PTP, APTT, INREXT, INREXT in the last 72 hours. No results for input(s): FE, TIBC, PSAT, FERR in the last 72 hours.    No results found for: FOL, RBCF   No results for input(s): PH, PCO2, PO2 in the last 72 hours. No results for input(s): CPK, CKNDX, TROIQ in the last 72 hours.     No lab exists for component: CPKMB  No results found for: CHOL, CHOLX, CHLST, CHOLV, HDL, HDLP, LDL, LDLC, DLDLP, TGLX, TRIGL, TRIGP, CHHD, CHHDX  Lab Results   Component Value Date/Time    Glucose (POC) 92 08/11/2021 08:44 AM    Glucose (POC) 118 (H) 08/10/2021 09:05 PM    Glucose (POC) 137 (H) 08/10/2021 04:45 PM    Glucose (POC) 106 08/10/2021 12:17 PM    Glucose (POC) 92 08/10/2021 08:25 AM     Lab Results   Component Value Date/Time    Color YELLOW 08/07/2011 01:30 PM    Appearance CLEAR 08/07/2011 01:30 PM    Specific gravity 1.024 08/07/2011 01:30 PM    pH (UA) 6.0 08/07/2011 01:30 PM    Protein 30 (A) 08/07/2011 01:30 PM    Glucose NEGATIVE  08/07/2011 01:30 PM    Ketone NEGATIVE  08/07/2011 01:30 PM    Bilirubin NEGATIVE  08/07/2011 01:30 PM    Urobilinogen 0.2 08/07/2011 01:30 PM    Nitrites NEGATIVE  08/07/2011 01:30 PM    Leukocyte Esterase NEGATIVE  08/07/2011 01:30 PM    Epithelial cells 0-5 08/07/2011 01:30 PM    Bacteria NEGATIVE  08/07/2011 01:30 PM    WBC 0-4 08/07/2011 01:30 PM    RBC 0-3 08/07/2011 01:30 PM         Medications Reviewed:     Current Facility-Administered Medications   Medication Dose Route Frequency    dexAMETHasone (DECADRON) tablet 6 mg  6 mg Oral DAILY    pantoprazole (PROTONIX) tablet 40 mg  40 mg Oral ACB    aluminum-magnesium hydroxide (MAALOX) oral suspension 15 mL  15 mL Oral QID PRN    ondansetron (ZOFRAN) injection 4 mg  4 mg IntraVENous Q6H PRN    simethicone (MYLICON) tablet 80 mg  80 mg Oral QID PRN    guaiFENesin-dextromethorphan (ROBITUSSIN DM) 100-10 mg/5 mL syrup 10 mL  10 mL Oral Q6H PRN    cholecalciferol (VITAMIN D3) (1000 Units /25 mcg) tablet 2,000 Units  2,000 Units Oral DAILY    insulin lispro (HUMALOG) injection   SubCUTAneous AC&HS    glucose chewable tablet 16 g  4 Tablet Oral PRN    dextrose (D50W) injection syrg 12.5-25 g  12.5-25 g IntraVENous PRN  glucagon (GLUCAGEN) injection 1 mg  1 mg IntraMUSCular PRN    enoxaparin (LOVENOX) injection 30 mg  30 mg SubCUTAneous Q12H    acetaminophen (TYLENOL) tablet 650 mg  650 mg Oral Q4H PRN    ascorbic acid (vitamin C) (VITAMIN C) tablet 500 mg  500 mg Oral BID    zinc sulfate (ZINCATE) 50 mg zinc (220 mg) capsule 1 Capsule  1 Capsule Oral DAILY     ______________________________________________________________________  EXPECTED LENGTH OF STAY: 4d 19h  ACTUAL LENGTH OF STAY:          8                 Sarahi Fitch MD

## 2021-08-12 ENCOUNTER — PATIENT OUTREACH (OUTPATIENT)
Dept: CASE MANAGEMENT | Age: 31
End: 2021-08-12

## 2021-08-12 NOTE — PROGRESS NOTES
Care Transitions Outreach Attempt    Call within 2 business days of discharge: Yes   Attempted to reach patient for transitions of care follow up. Unable to reach patient. Patient: Padmini Gomez Patient : 1990 MRN: [id number]    Last Discharge [address]       Complaint Diagnosis Description Type Department Provider    8/3/21 Positive For Covid-19 Pneumonia due to COVID-19 virus . .. ED to Hosp-Admission (Discharged) (ADMIT) Lidia Singh MD; Annmarie Ch. .. Was this an external facility discharge? No       Noted following upcoming appointments from discharge chart review:   [facility] follow up appointment(s): No future appointments.   Non-[facility] follow up appointment(s): none

## 2021-08-12 NOTE — PROGRESS NOTES
I have reviewed discharge instructions with the patient. The patient verbalized understanding. Discharge Medication List as of 8/11/2021 11:42 AM      START taking these medications    Details   predniSONE (DELTASONE) 10 mg tablet 4 tabs daily for 2 days then drop to   2 tabs daily for 2 days then drop to   1 tab daily for 2 days then stop., Print, Disp-14 Tablet, R-0      guaiFENesin-dextromethorphan (ROBITUSSIN DM) 100-10 mg/5 mL syrup Take 10 mL by mouth every six (6) hours as needed for Cough or Congestion for up to 10 days. , Print, Disp-1 Bottle, R-0           Pt discharge with belonging and oxygen

## 2021-08-12 NOTE — LETTER
8/12/2021 3:46 PM    Mr. Bridgette Ortega [id number]      Dear Bridgette Mar    My name is Eugenio Still and I am a Care Transition Nurse who partners with [facility]  to improve patients' health. I've been trying to reach you via phone to let you know that, as a member of your care team, I will work with other providers involved in your care, offer education for your specific health conditions, and connect you with more resources as needed. This program is designed to provide you with the opportunity to have a ([address] [facility]/[address]) care manager partner with you for the following situations:     1) if you come home from the hospital or emergency room   2) to help manage your disease   3) when you would like assistance coordinating services or appointments    This added support is provided at no additional cost to you. My primary focus is to help you achieve specific goals and improve your health. Please call me at [phone] to further discuss how I can support your health care needs.     Sincerely,    Eugenio Still MSN, RN, [address] [place] [address] Transition Nurse  [phone]

## 2021-08-19 ENCOUNTER — PATIENT OUTREACH (OUTPATIENT)
Dept: CASE MANAGEMENT | Age: 31
End: 2021-08-19

## 2021-08-19 NOTE — PROGRESS NOTES
08/19/21   No response to attempted calls or letter, episode closed.     Drea Galvan MSN, RN, CCM / Care Transition Nurse / [phone]

## 2025-05-15 NOTE — PROGRESS NOTES
Patient was swabbed for Covid-19 (PCR.)